# Patient Record
Sex: FEMALE | Race: WHITE | Employment: OTHER | URBAN - METROPOLITAN AREA
[De-identification: names, ages, dates, MRNs, and addresses within clinical notes are randomized per-mention and may not be internally consistent; named-entity substitution may affect disease eponyms.]

---

## 2017-02-16 LAB — MAMMOGRAPHY, EXTERNAL: NORMAL

## 2017-05-02 RX ORDER — LEVOTHYROXINE SODIUM 50 UG/1
TABLET ORAL
Qty: 100 TAB | Refills: 0 | Status: SHIPPED | OUTPATIENT
Start: 2017-05-02 | End: 2017-07-31 | Stop reason: DRUGHIGH

## 2017-05-02 NOTE — TELEPHONE ENCOUNTER
She is due in for full annual labs by the end of June-her thyroid is due now but she can do everything in June so that she doesn't have to be stuck twice. She is due for her [address] and this can be done with Osvaldo Seen in June along with a lab appt that morning. She doesn't have to see Dr Marilia Melvin yet-he did a pre op for her in November.   Lab only appt and appt with Pooja on the same day--thnx

## 2017-05-08 ENCOUNTER — OFFICE VISIT (OUTPATIENT)
Dept: FAMILY MEDICINE CLINIC | Age: 67
End: 2017-05-08

## 2017-05-08 VITALS
DIASTOLIC BLOOD PRESSURE: 82 MMHG | SYSTOLIC BLOOD PRESSURE: 163 MMHG | WEIGHT: 185 LBS | HEART RATE: 76 BPM | BODY MASS INDEX: 30.56 KG/M2

## 2017-05-08 DIAGNOSIS — Z13.31 SCREENING FOR DEPRESSION: ICD-10-CM

## 2017-05-08 DIAGNOSIS — Z00.00 ROUTINE GENERAL MEDICAL EXAMINATION AT A HEALTH CARE FACILITY: ICD-10-CM

## 2017-05-08 DIAGNOSIS — Z13.39 SCREENING FOR ALCOHOLISM: ICD-10-CM

## 2017-05-08 DIAGNOSIS — Z23 ENCOUNTER FOR IMMUNIZATION: ICD-10-CM
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
## 2017-05-08 NOTE — PATIENT INSTRUCTIONS
Medicare Part B Preventive Services Limitations Recommendation Scheduled   Bone Mass Measurement  (age 72 & older, biennial) Requires diagnosis related to osteoporosis or estrogen deficiency. Biennial benefit unless patient has history of long-term glucocorticoid tx or baseline is needed because initial test was by other method A DEXA scan is recommended after you turn 72years of age to determine your risk for osteoporosis Completed on 5/12/16. Cardiovascular Screening Blood Tests (every 5 years)  Total cholesterol, HDL, Triglycerides Order as a panel if possible Last: 6/27/16    Every Year Next: 6/2017   Colorectal Cancer Screening  -Fecal occult blood test (annual)  -Flexible sigmoidoscopy (5y)  -Screening colonoscopy (10y)  -Barium Enema  Last: 2/27/15    Recommended 5 year repeat Next: 2/2020   Diabetes Screening Tests (at least every 3 years, Medicare covers annually or at 6-month intervals for prediabetic patients)    Fasting blood sugar (FBS) or glucose tolerance test (GTT) Patient must be diagnosed with one of the following:  -Hypertension, Dyslipidemia, obesity, previous impaired FBS or GTT  Or any two of the following: overweight, FH of diabetes, age ? 72, history of gestational diabetes, birth of baby weighing more than 9 pounds Last: 6/27/16     Next: 6/2017   Glaucoma Screening (no USPSTF recommendation) Diabetes mellitus, family history, , age 48 or over,  American, age 72 or over Last: 6/15/16     Please get an eye exam sometime this year   Seasonal Influenza Vaccination (annually)  Last: 10/6/16    Every Fall Please get one this Fall. Shingles Vaccination  A shingles vaccine is recommended once in a lifetime after age 61 Please get one at your pharmacy. Pneumococcal Vaccination (once after 65)  Last:  Pneumovax: 1/1/01 and 1/1/83    Prevnar-13: 5/4/16 Due for your last pneumovax   Screening Mammography (biennial age 54-69)?  Annually (age 36 or over) Last: 2/16/17    Repeat in 1 year Next: 2/2018     Please get a shingles vaccine at your pharmacy. Since this vaccine is covered under your prescription benefits, ask the pharmacist to tell you what your copay will be. After you get the vaccine have the pharmacist fax in documentation to the office. Monitor your blood pressure at home and let Dr. Jacy Vergara know if it stays above 140/90.

## 2017-05-08 NOTE — MR AVS SNAPSHOT
Visit Information Date & Time Provider Department Dept. Phone Encounter #  
 5/8/2017 10:00 AM Guerline Iniguez PHARMD [facility] [phone] [id number] Upcoming Health Maintenance Date Due ZOSTER VACCINE AGE 60> 3/15/2008 PAP AKA CERVICAL CYTOLOGY 8/13/2015 Pneumococcal 65+ Low/Medium Risk (2 of 2 - PPSV23) 5/4/2017 MEDICARE YEARLY EXAM 5/5/2017 INFLUENZA AGE 9 TO ADULT 8/1/2017 BREAST CANCER SCRN MAMMOGRAM 2/16/2018 GLAUCOMA SCREENING Q2Y 6/15/2018 COLONOSCOPY 2/27/2020 DTaP/Tdap/Td series (3 - Td) 3/13/2020 Allergies as of 5/8/2017  Review Complete On: 5/8/2017 By: Guerline Iniguez PHARMD  
  
 Severity Noted Reaction Type Reactions Augmentin [Amoxicillin-pot Clavulanate]  03/22/2010    Nausea Only Current Immunizations  Reviewed on 5/8/2017 Name Date Influenza High Dose Vaccine PF 10/6/2016 11:20 AM, 11/15/2015 Influenza Vaccine 10/10/2012 Influenza Vaccine Split 9/22/2010 MMR Vaccine 11/13/2007 Pneumococcal Conjugate (PCV-13) 5/4/2016 11:41 AM  
 Pneumococcal Polysaccharide (PPSV-23)  Incomplete Pneumococcal Vaccine (Unspecified Type) 1/1/2001, 1/1/1983 TD Vaccine 3/13/2010 TDAP Vaccine 11/13/2007 Reviewed by Guerline Iniguez PHARMD on 5/8/2017 at 10:57 AM  
You Were Diagnosed With   
  
 Codes Comments Routine general medical examination at a health care facility     ICD-10-CM: Z00.00 ICD-9-CM: V70.0 Screening for alcoholism     ICD-10-CM: Z13.89 ICD-9-CM: V79.1 Encounter for immunization     ICD-10-CM: S34 ICD-9-CM: V03.89 Vitals BP Pulse Weight(growth percentile) BMI OB Status Smoking Status 163/82 76 185 lb (83.9 kg) 30.56 kg/m2 Postmenopausal Current Some Day Smoker Vitals History BMI and BSA Data Body Mass Index Body Surface Area 30.56 kg/m 2 1.97 m 2 Preferred Pharmacy Pharmacy Name Phone [id number] [facility] [address] AT [address] [facility] [phone] Your Updated Medication List  
  
   
This list is accurate as of: 5/8/17 11:31 AM.  Always use your most recent med list.  
  
  
  
  
 CALCIUM 600 PO Take 1 Tab by mouth nightly. cholecalciferol 1,000 unit Cap Commonly known as:  VITAMIN D3 Take 1,000 Units by mouth daily. levothyroxine 50 mcg tablet Commonly known as:  SYNTHROID  
TAKE 1 TABLET BY MOUTH EVERY DAY EXCEPT ON SATURDAY AND SUNDAY TAKE 1 AND 1/2 TABLETS  
  
 multivitamin tablet Commonly known as:  ONE A DAY Take 1 Tab by mouth daily. pravastatin 20 mg tablet Commonly known as:  PRAVACHOL  
TAKE 1 TABLET BY MOUTH NIGHTLY PROBIOTIC (B. COAGULANS) 10 billion cell Cpdr  
Generic drug:  Bacillus coagulans Take 1 Cap by mouth every other day. We Performed the Following ADMIN PNEUMOCOCCAL VACCINE [ [facility]] PNEUMOCOCCAL POLYSACCHARIDE VACCINE, 23-VALENT, ADULT OR IMMUNOSUPPRESSED PT DOSE, [85788 CPT(R)] Patient Instructions Medicare Part B Preventive Services Limitations Recommendation Scheduled Bone Mass Measurement 
(age 72 & older, biennial) Requires diagnosis related to osteoporosis or estrogen deficiency. Biennial benefit unless patient has history of long-term glucocorticoid tx or baseline is needed because initial test was by other method A DEXA scan is recommended after you turn 72years of age to determine your risk for osteoporosis Completed on 5/12/16. Cardiovascular Screening Blood Tests (every 5 years) Total cholesterol, HDL, Triglycerides Order as a panel if possible Last: 6/27/16 Every Year Next: 6/2017 Colorectal Cancer Screening 
-Fecal occult blood test (annual) -Flexible sigmoidoscopy (5y) 
-Screening colonoscopy (10y) -Barium Enema  Last: 2/27/15 Recommended 5 year repeat Next: 2/2020 Diabetes Screening Tests (at least every 3 years, Medicare covers annually or at 6-month intervals for prediabetic patients) Fasting blood sugar (FBS) or glucose tolerance test (GTT) Patient must be diagnosed with one of the following: 
-Hypertension, Dyslipidemia, obesity, previous impaired FBS or GTT 
Or any two of the following: overweight, FH of diabetes, age ? 72, history of gestational diabetes, birth of baby weighing more than 9 pounds Last: 6/27/16 Next: 6/2017 Glaucoma Screening (no USPSTF recommendation) Diabetes mellitus, family history, , age 48 or over,  American, age 72 or over Last: 6/15/16 Please get an eye exam sometime this year Seasonal Influenza Vaccination (annually)  Last: 10/6/16 Every Fall Please get one this Fall. Shingles Vaccination  A shingles vaccine is recommended once in a lifetime after age 61 Please get one at your pharmacy. Pneumococcal Vaccination (once after 65)  Last: 
Pneumovax: 1/1/01 and 1/1/83 Prevnar-13: 5/4/16 Due for your last pneumovax Screening Mammography (biennial age 54-69)? Annually (age 36 or over) Last: 2/16/17 Repeat in 1 year Next: 2/2018 Please get a shingles vaccine at your pharmacy. Since this vaccine is covered under your prescription benefits, ask the pharmacist to tell you what your copay will be. After you get the vaccine have the pharmacist fax in documentation to the office. Monitor your blood pressure at home and let Dr. Austin Hills know if it stays above 140/90. Introducing [facility]! Hillary Mccormick introduces Kedu[employer] patient portal. Now you can access parts of your medical record, email your doctor's office, and request medication refills online. 1. In your internet browser, go to [email or web address] 2. Click on the First Time User? Click Here link in the Sign In box. You will see the New Member Sign Up page. 3. Enter your WhoKnowst Access Code exactly as it appears below. You will not need to use this code after youve completed the sign-up process. If you do not sign up before the expiration date, you must request a new code. · GlobeIn Access Code: [facility] Expires: 8/6/2017 11:30 AM 
 
4. Enter the last four digits of your Social Security Number (xxxx) and Date of Birth (mm/dd/yyyy) as indicated and click Submit. You will be taken to the next sign-up page. 5. Create a WhoKnowst ID. This will be your GlobeIn login ID and cannot be changed, so think of one that is secure and easy to remember. 6. Create a GlobeIn password. You can change your password at any time. 7. Enter your Password Reset Question and Answer. This can be used at a later time if you forget your password. 8. Enter your e-mail address. You will receive e-mail notification when new information is available in [facility] [address]. 9. Click Sign Up. You can now view and download portions of your medical record. 10. Click the Download Summary menu link to download a portable copy of your medical information. If you have questions, please visit the Frequently Asked Questions section of the GlobeIn website. Remember, GlobeIn is NOT to be used for urgent needs. For medical emergencies, dial 911. Now available from your iPhone and Android! Please provide this summary of care documentation to your next provider. Your primary care clinician is listed as [id number] If you have any questions after today's visit, please call [phone].

## 2017-06-06 PROBLEM — R73.03 PREDIABETES: Status: ACTIVE | Noted: 2017-06-06

## 2017-06-12 ENCOUNTER — LAB ONLY (OUTPATIENT)
Dept: FAMILY MEDICINE CLINIC | Age: 67
End: 2017-06-12

## 2017-06-12 DIAGNOSIS — N39.3 URINARY, INCONTINENCE, STRESS FEMALE: ICD-10-CM

## 2017-06-12 DIAGNOSIS — R73.03 PREDIABETES: Primary | ICD-10-CM

## 2017-06-12 DIAGNOSIS — R73.09 INCREASED GLUCOSE LEVEL: ICD-10-CM

## 2017-06-12 DIAGNOSIS — E03.9 HYPOTHYROIDISM, UNSPECIFIED TYPE: ICD-10-CM

## 2017-06-12 DIAGNOSIS — E55.9 VITAMIN D DEFICIENCY: ICD-10-CM

## 2017-06-12 DIAGNOSIS — E78.00 PURE HYPERCHOLESTEROLEMIA: ICD-10-CM

## 2017-06-12 DIAGNOSIS — M85.80 OSTEOPENIA, UNSPECIFIED LOCATION: ICD-10-CM

## 2017-06-12 NOTE — LETTER
6/30/2017 10:23 AM 
 
Ms. Amanda Ernst [address] [facility] [address] [phone] [id number] Dear Amanda Ernst: Please find your most recent results below. Resulted Orders VITAMIN D, 25 HYDROXY Result Value Ref Range VITAMIN D, 25-HYDROXY 33.0 30.0 - 100.0 ng/mL Comment:  
   Vitamin D deficiency has been defined by the [address] [place] [address] practice guideline as a 
level of serum 25-OH vitamin D less than 20 ng/mL (1,2). The Endocrine Society went on to further define vitamin D 
insufficiency as a level between 21 and 29 ng/mL (2). 1. IOM ([place] of Medicine). 2010. Dietary reference 
   intakes for calcium and D. [address] [facility]: [facility] 
   [employer] 2. Jermaine MF, Haven JON, Savanna CANTU, et al. 
   Evaluation, treatment, and prevention of vitamin D 
   deficiency: an Endocrine Society clinical practice 
   guideline. JCEM. 2011 Jul; 96(7):1911-30. Narrative Performed at:  [address]  [id number] : Rehana Preston MD, Phone:  [phone] TSH 3RD GENERATION Result Value Ref Range TSH 5.600 (H) 0.450 - 4.500 uIU/mL Narrative Performed at:  [address]  [id number] : Rehana Preston MD, Phone:  [phone] T4, FREE Result Value Ref Range T4, Free 1.38 0.82 - 1.77 ng/dL Narrative Performed at:  [address]  [id number] : Rehana Prestno MD, Phone:  [phone] METABOLIC PANEL, COMPREHENSIVE Result Value Ref Range Glucose 111 (H) 65 - 99 mg/dL BUN 15 8 - 27 mg/dL Creatinine 0.79 0.57 - 1.00 mg/dL GFR est non-AA 77 >59 mL/min/1.73 GFR est AA 88 >59 mL/min/1.73  
 BUN/Creatinine ratio 19 12 - 28 Sodium 142 134 - 144 mmol/L Potassium 4.4 3.5 - 5.2 mmol/L  Chloride 103 96 - 106 mmol/L  
 CO2 22 18 - 29 mmol/L  
 Calcium 9.4 8.7 - 10.3 mg/dL Protein, total 6.7 6.0 - 8.5 g/dL Albumin 4.2 3.6 - 4.8 g/dL GLOBULIN, TOTAL 2.5 1.5 - 4.5 g/dL A-G Ratio 1.7 1.2 - 2.2 Bilirubin, total 0.9 0.0 - 1.2 mg/dL Alk. phosphatase 100 39 - 117 IU/L  
 AST (SGOT) 26 0 - 40 IU/L  
 ALT (SGPT) 20 0 - 32 IU/L Narrative Performed at:  [address]  [id number] : Carlos Chavez MD, Phone:  [phone] LIPID PANEL Result Value Ref Range Cholesterol, total 247 (H) 100 - 199 mg/dL Triglyceride 98 0 - 149 mg/dL HDL Cholesterol 94 >39 mg/dL VLDL, calculated 20 5 - 40 mg/dL LDL, calculated 133 (H) 0 - 99 mg/dL Narrative Performed at:  [address]  [id number] : Carlos Chavez MD, Phone:  [phone] UA/M W/RFLX CULTURE, ROUTINE Result Value Ref Range Specific Gravity 1.022 1.005 - 1.030  
 pH (UA) 6.0 5.0 - 7.5 Color Yellow Yellow Appearance Cloudy (A) Clear Leukocyte Esterase 2+ (A) Negative Protein Negative Negative/Trace Glucose Negative Negative Ketone Negative Negative Blood Negative Negative Bilirubin Negative Negative Urobilinogen 0.2 0.2 - 1.0 mg/dL Nitrites Negative Negative Microscopic Examination See additional order Comment:  
   Microscopic was indicated and was performed. URINALYSIS REFLEX Comment Comment: This specimen has reflexed to a Urine Culture. Narrative Performed at:  [address]  [id number] : Carlos Chavez MD, Phone:  [phone] HEMOGLOBIN A1C WITH EAG Result Value Ref Range Hemoglobin A1c 5.2 4.8 - 5.6 % Comment:  
            Pre-diabetes: 5.7 - 6.4 Diabetes: >6.4 Glycemic control for adults with diabetes: <7.0 Estimated average glucose 103 mg/dL Narrative Performed at:  [place] [address] [address]  [id number] : Rodríguez Garcia MD, Phone:  [phone] MICROSCOPIC EXAMINATION Result Value Ref Range WBC 11-30 (A) 0 - 5 /hpf  
 RBC 0-2 0 - 2 /hpf Epithelial cells 0-10 0 - 10 /hpf Casts None seen None seen /lpf Mucus Present Not Estab. Bacteria None seen None seen/Few Narrative Performed at:  [address]  [id number] : Rodríguez Garcia MD, Phone:  [phone] URINE CULTURE, ROUTINE Result Value Ref Range Urine Culture, Routine (A) Pseudomonas aeruginosa Greater than 100,000 colony forming units per mL Urine Culture, Routine Mixed urogenital oliverio 10,000-25,000 colony forming units per mL Narrative Performed at:  [address]  [id number] : Rodríguez Garcia MD, Phone:  [phone] CVD REPORT Result Value Ref Range INTERPRETATION Note Comment:  
   Supplement report is available. Narrative Performed at:  [address] [place] [address] A [address] [place] [address]  [id number] : Jono Black PhD, Phone:  [phone] RECOMMENDATIONS: 
 
 
Sincerely, Lab Brfp

## 2017-06-12 NOTE — LETTER
6/28/2017 3:07 PM 
 
Ms. Abdifatah Adkins [address] [facility]. Unit [phone] [phone] Dear Abdifatah Adkins: Please find your most recent results below. Resulted Orders VITAMIN D, 25 HYDROXY Result Value Ref Range VITAMIN D, 25-HYDROXY 33.0 30.0 - 100.0 ng/mL Comment:  
   Vitamin D deficiency has been defined by the [address] [place] [address] practice guideline as a 
level of serum 25-OH vitamin D less than 20 ng/mL (1,2). The Endocrine Society went on to further define vitamin D 
insufficiency as a level between 21 and 29 ng/mL (2). 1. IOM ([place] of Medicine). 2010. Dietary reference 
   intakes for calcium and D. [facility]: The 
   [employer]. 2. Jermaine MF, Haven JON, Savanna CANTU, et al. 
   Evaluation, treatment, and prevention of vitamin D 
   deficiency: an Endocrine Society clinical practice 
   guideline. JCEM. 2011 Jul; 96(7):1911-30. Narrative Performed at:  [address]  [id number] : Lety Arcos MD, Phone:  [phone] TSH 3RD GENERATION Result Value Ref Range TSH 5.600 (H) 0.450 - 4.500 uIU/mL Narrative Performed at:  [address]  [id number] : Lety Arcos MD, Phone:  [phone] T4, FREE Result Value Ref Range T4, Free 1.38 0.82 - 1.77 ng/dL Narrative Performed at:  [address]  [id number] : Lety Arcos MD, Phone:  [phone] METABOLIC PANEL, COMPREHENSIVE Result Value Ref Range Glucose 111 (H) 65 - 99 mg/dL BUN 15 8 - 27 mg/dL Creatinine 0.79 0.57 - 1.00 mg/dL GFR est non-AA 77 >59 mL/min/1.73 GFR est AA 88 >59 mL/min/1.73  
 BUN/Creatinine ratio 19 12 - 28 Sodium 142 134 - 144 mmol/L Potassium 4.4 3.5 - 5.2 mmol/L  Chloride 103 96 - 106 mmol/L  
 CO2 22 18 - 29 mmol/L  
 Calcium 9.4 8.7 - 10.3 mg/dL Protein, total 6.7 6.0 - 8.5 g/dL Albumin 4.2 3.6 - 4.8 g/dL GLOBULIN, TOTAL 2.5 1.5 - 4.5 g/dL A-G Ratio 1.7 1.2 - 2.2 Bilirubin, total 0.9 0.0 - 1.2 mg/dL Alk. phosphatase 100 39 - 117 IU/L  
 AST (SGOT) 26 0 - 40 IU/L  
 ALT (SGPT) 20 0 - 32 IU/L Narrative Performed at:  [address]  [id number] : Pedro Patton MD, Phone:  [phone] LIPID PANEL Result Value Ref Range Cholesterol, total 247 (H) 100 - 199 mg/dL Triglyceride 98 0 - 149 mg/dL HDL Cholesterol 94 >39 mg/dL VLDL, calculated 20 5 - 40 mg/dL LDL, calculated 133 (H) 0 - 99 mg/dL Narrative Performed at:  [address]  [id number] : Pedro Patton MD, Phone:  [phone] UA/M W/RFLX CULTURE, ROUTINE Result Value Ref Range Specific Gravity 1.022 1.005 - 1.030  
 pH (UA) 6.0 5.0 - 7.5 Color Yellow Yellow Appearance Cloudy (A) Clear Leukocyte Esterase 2+ (A) Negative Protein Negative Negative/Trace Glucose Negative Negative Ketone Negative Negative Blood Negative Negative Bilirubin Negative Negative Urobilinogen 0.2 0.2 - 1.0 mg/dL Nitrites Negative Negative Microscopic Examination See additional order Comment:  
   Microscopic was indicated and was performed. URINALYSIS REFLEX Comment Comment: This specimen has reflexed to a Urine Culture. Narrative Performed at:  [address]  [id number] : Pedro Patton MD, Phone:  [phone] HEMOGLOBIN A1C WITH EAG Result Value Ref Range Hemoglobin A1c 5.2 4.8 - 5.6 % Comment:  
            Pre-diabetes: 5.7 - 6.4 Diabetes: >6.4 Glycemic control for adults with diabetes: <7.0 Estimated average glucose 103 mg/dL Narrative Performed at:  [place] [address] [address]  [id number] : Sloane Guerrero MD, Phone:  [phone] MICROSCOPIC EXAMINATION Result Value Ref Range WBC 11-30 (A) 0 - 5 /hpf  
 RBC 0-2 0 - 2 /hpf Epithelial cells 0-10 0 - 10 /hpf Casts None seen None seen /lpf Mucus Present Not Estab. Bacteria None seen None seen/Few Narrative Performed at:  [address]  [id number] : Sloane Guerrero MD, Phone:  [phone] URINE CULTURE, ROUTINE Result Value Ref Range Urine Culture, Routine (A) Pseudomonas aeruginosa Greater than 100,000 colony forming units per mL Urine Culture, Routine Mixed urogenital oliverio 10,000-25,000 colony forming units per mL Narrative Performed at:  [address]  [id number] : Sloane Guerrero MD, Phone:  [phone] CVD REPORT Result Value Ref Range INTERPRETATION Note Comment:  
   Supplement report is available. Narrative Performed at:  [address] [place] [address] [address] [place] [address]  [id number] : Faye Bishop PhD, Phone:  [phone] RECOMMENDATIONS: 
 
Sincerely, [facility]

## 2017-06-28 LAB
25(OH)D3+25(OH)D2 SERPL-MCNC: 33 NG/ML (ref 30–100)
ALBUMIN SERPL-MCNC: 4.2 G/DL (ref 3.6–4.8)
ALBUMIN/GLOB SERPL: 1.7 {RATIO} (ref 1.2–2.2)
ALP SERPL-CCNC: 100 IU/L (ref 39–117)
ALT SERPL-CCNC: 20 IU/L (ref 0–32)
APPEARANCE UR: ABNORMAL
AST SERPL-CCNC: 26 IU/L (ref 0–40)
BACTERIA #/AREA URNS HPF: ABNORMAL /[HPF]
BACTERIA UR CULT: ABNORMAL
BACTERIA UR CULT: ABNORMAL
BILIRUB SERPL-MCNC: 0.9 MG/DL (ref 0–1.2)
BILIRUB UR QL STRIP: NEGATIVE
BUN SERPL-MCNC: 15 MG/DL (ref 8–27)
BUN/CREAT SERPL: 19 (ref 12–28)
CALCIUM SERPL-MCNC: 9.4 MG/DL (ref 8.7–10.3)
CASTS URNS QL MICRO: ABNORMAL /LPF
CHLORIDE SERPL-SCNC: 103 MMOL/L (ref 96–106)
CHOLEST SERPL-MCNC: 247 MG/DL (ref 100–199)
CO2 SERPL-SCNC: 22 MMOL/L (ref 18–29)
COLOR UR: YELLOW
CREAT SERPL-MCNC: 0.79 MG/DL (ref 0.57–1)
EPI CELLS #/AREA URNS HPF: ABNORMAL /HPF
EST. AVERAGE GLUCOSE BLD GHB EST-MCNC: 103 MG/DL
GLOBULIN SER CALC-MCNC: 2.5 G/DL (ref 1.5–4.5)
GLUCOSE SERPL-MCNC: 111 MG/DL (ref 65–99)
GLUCOSE UR QL: NEGATIVE
HBA1C MFR BLD: 5.2 % (ref 4.8–5.6)
HDLC SERPL-MCNC: 94 MG/DL
HGB UR QL STRIP: NEGATIVE
INTERPRETATION, 910389: NORMAL
KETONES UR QL STRIP: NEGATIVE
LDLC SERPL CALC-MCNC: 133 MG/DL (ref 0–99)
LEUKOCYTE ESTERASE UR QL STRIP: ABNORMAL
MICRO URNS: ABNORMAL
MUCOUS THREADS URNS QL MICRO: PRESENT
NITRITE UR QL STRIP: NEGATIVE
PH UR STRIP: 6 [PH] (ref 5–7.5)
POTASSIUM SERPL-SCNC: 4.4 MMOL/L (ref 3.5–5.2)
PROT SERPL-MCNC: 6.7 G/DL (ref 6–8.5)
PROT UR QL STRIP: NEGATIVE
RBC #/AREA URNS HPF: ABNORMAL /HPF
SODIUM SERPL-SCNC: 142 MMOL/L (ref 134–144)
SP GR UR: 1.02 (ref 1–1.03)
T4 FREE SERPL-MCNC: 1.38 NG/DL (ref 0.82–1.77)
TRIGL SERPL-MCNC: 98 MG/DL (ref 0–149)
TSH SERPL DL<=0.005 MIU/L-ACNC: 5.6 UIU/ML (ref 0.45–4.5)
URINALYSIS REFLEX, 377202: ABNORMAL
UROBILINOGEN UR STRIP-MCNC: 0.2 MG/DL (ref 0.2–1)
VLDLC SERPL CALC-MCNC: 20 MG/DL (ref 5–40)
WBC #/AREA URNS HPF: ABNORMAL /HPF

## 2017-06-28 NOTE — PROGRESS NOTES
Thyroid low. If no missed doses increase to next increment and recheck 4-6 weeks. Lipids slt improved. Nml average BS. Pyuria. Pos UC. Begin Cipro 250 mg BID for 3 days and recheck post tx.

## 2017-06-30 DIAGNOSIS — R82.81 PYURIA: Primary | ICD-10-CM

## 2017-06-30 DIAGNOSIS — E03.9 HYPOTHYROIDISM, UNSPECIFIED TYPE: ICD-10-CM

## 2017-06-30 RX ORDER — LEVOTHYROXINE SODIUM 75 UG/1
75 TABLET ORAL
Qty: 30 TAB | Refills: 0 | Status: SHIPPED | OUTPATIENT
Start: 2017-06-30 | End: 2017-07-30 | Stop reason: SDUPTHER

## 2017-06-30 RX ORDER — CIPROFLOXACIN 250 MG/1
250 TABLET, FILM COATED ORAL EVERY 12 HOURS
Qty: 6 TAB | Refills: 0 | Status: SHIPPED | OUTPATIENT
Start: 2017-06-30 | End: 2017-07-03

## 2017-06-30 NOTE — PROGRESS NOTES
Discussed and no missed doses-rx done -order slip to patient to repeat UC once done with Rx and in 6 weeks for dose increase on the thyroid.  Prefers to go to [place] since it is so much closer to her job

## 2017-07-04 LAB — BACTERIA UR CULT: NORMAL

## 2017-07-24 RX ORDER — PRAVASTATIN SODIUM 20 MG/1
TABLET ORAL
Qty: 90 TAB | Refills: 3 | Status: SHIPPED | OUTPATIENT
Start: 2017-07-24 | End: 2018-07-19 | Stop reason: SDUPTHER

## 2017-07-31 RX ORDER — LEVOTHYROXINE SODIUM 75 UG/1
TABLET ORAL
Qty: 30 TAB | Refills: 0 | Status: SHIPPED | OUTPATIENT
Start: 2017-07-31 | End: 2017-08-30 | Stop reason: SDUPTHER

## 2017-08-10 NOTE — PROGRESS NOTES
Patient told negative UC-will follow up if MD reviews and feels differently. Results provided by phone. Please see addendum note on 8/9/17 by me.    Austin Garsia MD, MS    Department of Dermatology  [facility]: Phone: [phone], Fax:[phone]  [facility]: Phone: [phone], Fax: [phone]

## 2017-08-29 LAB
T4 FREE SERPL-MCNC: 1.38 NG/DL (ref 0.82–1.77)
TSH SERPL DL<=0.005 MIU/L-ACNC: 2.31 UIU/ML (ref 0.45–4.5)

## 2017-09-05 ENCOUNTER — TELEPHONE (OUTPATIENT)
Dept: FAMILY MEDICINE CLINIC | Age: 67
End: 2017-09-05

## 2017-09-05 RX ORDER — LEVOTHYROXINE SODIUM 75 UG/1
TABLET ORAL
Qty: 90 TAB | Refills: 3 | Status: SHIPPED | OUTPATIENT
Start: 2017-09-05 | End: 2018-08-12 | Stop reason: SDUPTHER

## 2017-09-05 NOTE — TELEPHONE ENCOUNTER
----- Message from Ca Evans sent at 9/5/2017 10:46 AM EDT -----  Regarding: Dr. Shashank Hernandes   Pt is f/up on her request for refill on levothyroxine. Best contact is [phone]. Pt use [facility] on file. [phone].

## 2017-10-09 DIAGNOSIS — M23.91 ACUTE INTERNAL DERANGEMENT OF KNEE, RIGHT: Primary | ICD-10-CM

## 2018-07-19 RX ORDER — PRAVASTATIN SODIUM 20 MG/1
TABLET ORAL
Qty: 90 TAB | Refills: 0 | Status: SHIPPED | OUTPATIENT
Start: 2018-07-19 | End: 2018-10-14 | Stop reason: SDUPTHER

## 2018-07-19 NOTE — TELEPHONE ENCOUNTER
PCP: Elda Jenkins MD    Last appt: 6/26/2017  No future appointments.     Requested Prescriptions     Pending Prescriptions Disp Refills    pravastatin (PRAVACHOL) 20 mg tablet [Pharmacy Med Name: PRAVASTATIN SODIUM 20 MG TAB] 90 Tab 3     Sig: TAKE 1 TABLET BY MOUTH NIGHTLY       Prior labs and Blood pressures:  BP Readings from Last 3 Encounters:   05/08/17 163/82   10/06/16 131/79   05/04/16 132/73     Lab Results   Component Value Date/Time    Sodium 142 06/26/2017 09:05 AM    Potassium 4.4 06/26/2017 09:05 AM    Chloride 103 06/26/2017 09:05 AM    CO2 22 06/26/2017 09:05 AM    Anion gap 12 09/22/2010 09:41 AM    Glucose 111 (H) 06/26/2017 09:05 AM    BUN 15 06/26/2017 09:05 AM    Creatinine 0.79 06/26/2017 09:05 AM    BUN/Creatinine ratio 19 06/26/2017 09:05 AM    GFR est AA 88 06/26/2017 09:05 AM    GFR est non-AA 77 06/26/2017 09:05 AM    Calcium 9.4 06/26/2017 09:05 AM     Lab Results   Component Value Date/Time    Hemoglobin A1c 5.2 06/26/2017 09:05 AM     Lab Results   Component Value Date/Time    Cholesterol, total 247 (H) 06/26/2017 09:05 AM    HDL Cholesterol 94 06/26/2017 09:05 AM    LDL, calculated 133 (H) 06/26/2017 09:05 AM    VLDL, calculated 20 06/26/2017 09:05 AM    Triglyceride 98 06/26/2017 09:05 AM    CHOL/HDL Ratio 2.7 09/22/2010 09:41 AM     Lab Results   Component Value Date/Time    Vitamin D 25-Hydroxy 33.6 12/28/2010 08:15 AM    VITAMIN D, 25-HYDROXY 33.0 06/26/2017 09:05 AM       Lab Results   Component Value Date/Time    TSH 2.310 08/28/2017 07:56 AM

## 2018-08-09 ENCOUNTER — TELEPHONE (OUTPATIENT)
Dept: FAMILY MEDICINE CLINIC | Age: 68
End: 2018-08-09

## 2018-08-09 NOTE — TELEPHONE ENCOUNTER
Patient called the office complaining about her visit being cancelled yesterday on 8/8/18 and she didn't know why (phytel cancellation), offered patient same day appointment for different times, patient declined. Patient called in the office this morning and was offered a same day appointment for today and declined, patient is requesting medication to be sent in to calm her nerves on the plane. Patient stated Rebel Carrillo has prescribed it before.  Also informed patient that she might need an ov.   P: [phone]

## 2018-10-01 ENCOUNTER — OFFICE VISIT (OUTPATIENT)
Dept: FAMILY MEDICINE CLINIC | Age: 68
End: 2018-10-01

## 2018-10-01 VITALS
TEMPERATURE: 96.3 F | DIASTOLIC BLOOD PRESSURE: 62 MMHG | OXYGEN SATURATION: 98 % | HEIGHT: 65 IN | RESPIRATION RATE: 18 BRPM | HEART RATE: 83 BPM | SYSTOLIC BLOOD PRESSURE: 128 MMHG | WEIGHT: 181.7 LBS | BODY MASS INDEX: 30.27 KG/M2

## 2018-10-01 DIAGNOSIS — N39.3 URINARY, INCONTINENCE, STRESS FEMALE: ICD-10-CM

## 2018-10-01 DIAGNOSIS — R73.03 PREDIABETES: ICD-10-CM

## 2018-10-01 DIAGNOSIS — E03.9 HYPOTHYROIDISM, UNSPECIFIED TYPE: Primary | ICD-10-CM

## 2018-10-01 DIAGNOSIS — Z23 ENCOUNTER FOR IMMUNIZATION: ICD-10-CM

## 2018-10-01 DIAGNOSIS — E55.9 VITAMIN D DEFICIENCY: ICD-10-CM

## 2018-10-01 DIAGNOSIS — E78.00 PURE HYPERCHOLESTEROLEMIA: ICD-10-CM

## 2018-10-01 DIAGNOSIS — M85.80 OSTEOPENIA, UNSPECIFIED LOCATION: ICD-10-CM

## 2018-10-01 RX ORDER — LEVOTHYROXINE SODIUM 75 UG/1
TABLET ORAL
Qty: 90 TAB | Refills: 3 | Status: SHIPPED | OUTPATIENT
Start: 2018-10-01 | End: 2019-09-21 | Stop reason: SDUPTHER

## 2018-10-01 NOTE — PROGRESS NOTES
Hernesto Jackson  Identified pt with two pt identifiers(name and ). No chief complaint on file. 1. Have you been to the ER, urgent care clinic since your last visit? Hospitalized since your last visit? no 
 
2. Have you seen or consulted any other health care providers outside of the [address] [place] [address] since your last visit? Include any pap smears or colon screening. no Advance Care Planning In the event something were to happen to you and you were unable to speak on your behalf, do you have an Advance Directive/ Living Will in place stating your wishes? yes If yes, do we have a copy on file no Medication reconciliation up to date and corrected with patient at this time. Today's provider has been notified of reason for visit, vitals and flowsheets obtained on patients. Reviewed record in preparation for visit, huddled with provider and have obtained necessary documentation. Health Maintenance Due Topic  Shingrix Vaccine Age 50> (1 of 2)  PAP AKA CERVICAL CYTOLOGY  MEDICARE YEARLY EXAM   
 GLAUCOMA SCREENING Q2Y  Influenza Age 5 to Adult Wt Readings from Last 3 Encounters:  
10/01/18 181 lb 11.2 oz (82.4 kg) 17 185 lb (83.9 kg) 10/06/16 190 lb 0.6 oz (86.2 kg) Temp Readings from Last 3 Encounters:  
10/01/18 96.3 °F (35.7 °C) (Oral) 10/06/16 97.3 °F (36.3 °C) (Oral) 16 97 °F (36.1 °C) (Oral) BP Readings from Last 3 Encounters:  
10/01/18 128/62  
17 163/82  
10/06/16 131/79 Pulse Readings from Last 3 Encounters:  
10/01/18 83  
17 76  
10/06/16 79 Vitals:  
 10/01/18 9719 BP: 128/62 Pulse: 83 Resp: 18 Temp: 96.3 °F (35.7 °C) TempSrc: Oral  
SpO2: 98% Weight: 181 lb 11.2 oz (82.4 kg) Height: 5' 5\" (1.651 m) PainSc:   0 - No pain Learning Assessment: 
:  
 
Learning Assessment 10/1/2018 2014 PRIMARY LEARNER Patient Patient HIGHEST LEVEL OF EDUCATION - PRIMARY LEARNER  2 YEARS OF COLLEGE 2 YEARS OF COLLEGE  
BARRIERS PRIMARY LEARNER - VISUAL  
CO-LEARNER CAREGIVER - No  
PRIMARY LANGUAGE ENGLISH ENGLISH  
LEARNER PREFERENCE PRIMARY DEMONSTRATION DEMONSTRATION  
ANSWERED BY Patient patient RELATIONSHIP SELF SELF Depression Screening: 
:  
 
PHQ over the last two weeks 10/1/2018 Little interest or pleasure in doing things Not at all Feeling down, depressed, irritable, or hopeless Not at all Total Score PHQ 2 0 Fall Risk Assessment: 
:  
 
Fall Risk Assessment, last 12 mths 5/8/2017 Able to walk? Yes Fall in past 12 months? Yes Fall with injury? No  
Number of falls in past 12 months 1 Fall Risk Score 1 Abuse Screening: 
:  
 
Abuse Screening Questionnaire 10/1/2018 Do you ever feel afraid of your partner? Charles Miranda Are you in a relationship with someone who physically or mentally threatens you? Charlesadriano Miranda Is it safe for you to go home? Y  
 
 
ADL Screening: 
:  
 
ADL Assessment 10/1/2018 Feeding yourself No Help Needed Getting from bed to chair No Help Needed Getting dressed No Help Needed Bathing or showering No Help Needed Walk across the room (includes cane/walker) No Help Needed Using the telphone No Help Needed Taking your medications No Help Needed Preparing meals No Help Needed Managing money (expenses/bills) No Help Needed Moderately strenuous housework (laundry) No Help Needed Shopping for personal items (toiletries/medicines) No Help Needed Shopping for groceries No Help Needed Driving No Help Needed Climbing a flight of stairs No Help Needed Getting to places beyond walking distances No Help Needed Medication reconciliation up to date and corrected with patient at this time.

## 2018-10-01 NOTE — PROGRESS NOTES
Coughing past week. Began with ST. Coughing up cloudy mucus. Hoarse off and on for a week. Taking Mucinex. No fever. Redeveloped fear of flying. Previously took Valium. Discussed new Shingles vaccine. Here for thyroid med refill. Annual labs and [address] overdue. Needs Td. Visit Vitals  /62 (BP 1 Location: Right arm, BP Patient Position: Sitting)  Pulse 83  Temp 96.3 °F (35.7 °C) (Oral)  Resp 18  Ht 5' 5\" (1.651 m)  Wt 181 lb 11.2 oz (82.4 kg)  SpO2 98%  BMI 30.24 kg/m2 Patient alert and cooperative. Lungs clear all fields. Coarse sounding cough. Assessment: 
1. URI. Plan: 1. Call if purulence, fever, illness lasting over 10 days. 2. Annual labs ordered. 3. Return for wellness visit, controlled med refill for flying. 4. Follow otherwise here prn.

## 2018-10-01 NOTE — MR AVS SNAPSHOT
[address] [place] [address] 
[address] [place] [address] 
[phone] Patient: Wes De Jesus MRN:  [id number] Visit Information Date & Time Provider Department Dept. Phone Encounter #  
 10/1/2018  9:40 AM Stefani Hodgkins, MD [facility] [phone] [id number] Follow-up Instructions Return in about 4 weeks (around 10/29/2018) for [address], Controlled med refill, 40 min. Upcoming Health Maintenance Date Due Shingrix Vaccine Age 50> (1 of 2) 3/15/1998 MEDICARE YEARLY EXAM 5/9/2018 GLAUCOMA SCREENING Q2Y 6/15/2018 BREAST CANCER SCRN MAMMOGRAM 2/19/2019 COLONOSCOPY 2/27/2020 DTaP/Tdap/Td series (3 - Td) 3/13/2020 Allergies as of 10/1/2018  Review Complete On: 10/1/2018 By: Stefani Hodgkins, MD  
  
 Severity Noted Reaction Type Reactions Augmentin [Amoxicillin-pot Clavulanate]  03/22/2010    Nausea Only Current Immunizations  Reviewed on 2/12/2018 Name Date Influenza High Dose Vaccine PF 9/10/2018, 10/6/2016 11:20 AM, 11/15/2015 Influenza Vaccine 10/10/2012 Influenza Vaccine Split 9/22/2010 MMR Vaccine 11/13/2007 Pneumococcal Conjugate (PCV-13) 2/2/2018, 5/4/2016 11:41 AM  
 Pneumococcal Polysaccharide (PPSV-23) 5/8/2017 TD Vaccine 3/13/2010 TDAP Vaccine 11/13/2007 Td, Adsorbed  Incomplete ZZZ-RETIRED (DO NOT USE) Pneumococcal Vaccine (Unspecified Type) 1/1/2001, 1/1/1983 Not reviewed this visit You Were Diagnosed With   
  
 Codes Comments Hypothyroidism, unspecified type    -  Primary ICD-10-CM: E03.9 ICD-9-CM: 919. 9 Vitamin D deficiency     ICD-10-CM: E55.9 ICD-9-CM: 268.9 Pure hypercholesterolemia     ICD-10-CM: E78.00 ICD-9-CM: 272.0 Osteopenia, unspecified location     ICD-10-CM: M85.80 ICD-9-CM: 733.90 Prediabetes     ICD-10-CM: R73.03 
ICD-9-CM: 790.29 Urinary, incontinence, stress female     ICD-10-CM: N39.3 ICD-9-CM: 625.6 Encounter for immunization     ICD-10-CM: O15 ICD-9-CM: V03.89 Vitals BP Pulse Temp Resp Height(growth percentile) Weight(growth percentile) 128/62 (BP 1 Location: Right arm, BP Patient Position: Sitting) 83 96.3 °F (35.7 °C) (Oral) 18 5' 5\" (1.651 m) 181 lb 11.2 oz (82.4 kg) SpO2 BMI OB Status Smoking Status 98% 30.24 kg/m2 Postmenopausal Current Some Day Smoker Vitals History BMI and BSA Data Body Mass Index Body Surface Area  
 30.24 kg/m 2 1.94 m 2 Preferred Pharmacy Pharmacy Name Phone [id number] [id number] [facility] AT [address] [place] [address] [phone] Your Updated Medication List  
  
   
This list is accurate as of 10/1/18 10:29 AM.  Always use your most recent med list.  
  
  
  
  
 CALCIUM 600 PO Take 1 Tab by mouth nightly. cholecalciferol 1,000 unit Cap Commonly known as:  VITAMIN D3 Take 1,000 Units by mouth daily. levothyroxine 75 mcg tablet Commonly known as:  SYNTHROID  
TAKE 1 TABLET BY MOUTH EVERY DAY BEFORE BREAKFAST  
  
 multivitamin tablet Commonly known as:  ONE A DAY Take 1 Tab by mouth daily. pravastatin 20 mg tablet Commonly known as:  PRAVACHOL  
TAKE 1 TABLET BY MOUTH NIGHTLY PROBIOTIC (B. COAGULANS) 10 billion cell Cpdr  
Generic drug:  Bacillus coagulans Take 1 Cap by mouth every other day. Prescriptions Sent to Pharmacy Refills  
 levothyroxine (SYNTHROID) 75 mcg tablet 3 Sig: TAKE 1 TABLET BY MOUTH EVERY DAY BEFORE BREAKFAST Class: Normal  
 Pharmacy: [facility]/pharmacy #[id number] [id number] [facility] AT [address] [place] [address] Ph #: [phone] We Performed the Following CBC WITH AUTOMATED DIFF [66105 CPT(R)] HEMOGLOBIN A1C WITH EAG [07471 CPT(R)] LIPID PANEL [86422 CPT(R)] METABOLIC PANEL, COMPREHENSIVE [69664 CPT(R)] CT IMMUNIZ ADMIN,1 SINGLE/COMB VAC/TOXOID [id number] CPT(R)] T4, FREE [id number] CPT(R)] TETANUS AND DIPHTHERIA TOXOIDS (TD) ADSORBED, PRES. FREE, IN INDIVIDS. >=7, IM [id number] CPT(R)] TSH 3RD GENERATION [20358 CPT(R)] URINALYSIS W/ RFLX MICROSCOPIC [43879 CPT(R)] VITAMIN D, 25 HYDROXY [id number] CPT(R)] Follow-up Instructions Return in about 4 weeks (around 10/29/2018) for [address], Controlled med refill, 40 min. Introducing [facility]! [employer] introduces [employer] patient portal. Now you can access parts of your medical record, email your doctor's office, and request medication refills online. 1. In your internet browser, go to [email or web address] 2. Click on the First Time User? Click Here link in the Sign In box. You will see the New Member Sign Up page. 3. Enter your [employer] Access Code exactly as it appears below. You will not need to use this code after youve completed the sign-up process. If you do not sign up before the expiration date, you must request a new code. · [employer] Access Code: [email or web address] Expires: 12/30/2018 10:11 AM 
 
4. Enter the last four digits of your Social Security Number (xxxx) and Date of Birth (mm/dd/yyyy) as indicated and click Submit. You will be taken to the next sign-up page. 5. Create a [employer] ID. This will be your [employer] login ID and cannot be changed, so think of one that is secure and easy to remember. 6. Create a [employer] password. You can change your password at any time. 7. Enter your Password Reset Question and Answer. This can be used at a later time if you forget your password. 8. Enter your e-mail address. You will receive e-mail notification when new information is available in [address] 9. Click Sign Up. You can now view and download portions of your medical record. 10. Click the Download Summary menu link to download a portable copy of your medical information.  
 
If you have questions, please visit the Frequently Asked Questions section of the [employer]. Remember, [email or web address] is NOT to be used for urgent needs. For medical emergencies, dial 911. Now available from your iPhone and Android! Please provide this summary of care documentation to your next provider. Your primary care clinician is listed as [id number] If you have any questions after today's visit, please call [phone].

## 2018-10-02 LAB
25(OH)D3+25(OH)D2 SERPL-MCNC: 47.9 NG/ML (ref 30–100)
ALBUMIN SERPL-MCNC: 4.3 G/DL (ref 3.5–4.8)
ALBUMIN/GLOB SERPL: 1.7 {RATIO} (ref 1.2–2.2)
ALP SERPL-CCNC: 96 IU/L (ref 39–117)
ALT SERPL-CCNC: 14 IU/L (ref 0–32)
APPEARANCE UR: ABNORMAL
AST SERPL-CCNC: 20 IU/L (ref 0–40)
BACTERIA #/AREA URNS HPF: ABNORMAL /[HPF]
BASOPHILS # BLD AUTO: 0.1 X10E3/UL (ref 0–0.2)
BASOPHILS NFR BLD AUTO: 1 %
BILIRUB SERPL-MCNC: 0.7 MG/DL (ref 0–1.2)
BILIRUB UR QL STRIP: NEGATIVE
BUN SERPL-MCNC: 18 MG/DL (ref 8–27)
BUN/CREAT SERPL: 26 (ref 12–28)
CALCIUM SERPL-MCNC: 9.3 MG/DL (ref 8.7–10.3)
CASTS URNS QL MICRO: ABNORMAL /LPF
CHLORIDE SERPL-SCNC: 103 MMOL/L (ref 96–106)
CHOLEST SERPL-MCNC: 235 MG/DL (ref 100–199)
CO2 SERPL-SCNC: 26 MMOL/L (ref 20–29)
COLOR UR: YELLOW
CREAT SERPL-MCNC: 0.68 MG/DL (ref 0.57–1)
EOSINOPHIL # BLD AUTO: 0.2 X10E3/UL (ref 0–0.4)
EOSINOPHIL NFR BLD AUTO: 5 %
EPI CELLS #/AREA URNS HPF: ABNORMAL /HPF
ERYTHROCYTE [DISTWIDTH] IN BLOOD BY AUTOMATED COUNT: 13.5 % (ref 12.3–15.4)
EST. AVERAGE GLUCOSE BLD GHB EST-MCNC: 111 MG/DL
GLOBULIN SER CALC-MCNC: 2.6 G/DL (ref 1.5–4.5)
GLUCOSE SERPL-MCNC: 104 MG/DL (ref 65–99)
GLUCOSE UR QL: NEGATIVE
HBA1C MFR BLD: 5.5 % (ref 4.8–5.6)
HCT VFR BLD AUTO: 42.8 % (ref 34–46.6)
HDLC SERPL-MCNC: 94 MG/DL
HGB BLD-MCNC: 14.6 G/DL (ref 11.1–15.9)
HGB UR QL STRIP: NEGATIVE
IMM GRANULOCYTES # BLD: 0 X10E3/UL (ref 0–0.1)
IMM GRANULOCYTES NFR BLD: 0 %
INTERPRETATION, 910389: NORMAL
KETONES UR QL STRIP: ABNORMAL
LDLC SERPL CALC-MCNC: 129 MG/DL (ref 0–99)
LEUKOCYTE ESTERASE UR QL STRIP: ABNORMAL
LYMPHOCYTES # BLD AUTO: 1.5 X10E3/UL (ref 0.7–3.1)
LYMPHOCYTES NFR BLD AUTO: 30 %
MCH RBC QN AUTO: 31.1 PG (ref 26.6–33)
MCHC RBC AUTO-ENTMCNC: 34.1 G/DL (ref 31.5–35.7)
MCV RBC AUTO: 91 FL (ref 79–97)
MICRO URNS: ABNORMAL
MONOCYTES # BLD AUTO: 0.5 X10E3/UL (ref 0.1–0.9)
MONOCYTES NFR BLD AUTO: 9 %
MUCOUS THREADS URNS QL MICRO: PRESENT
NEUTROPHILS # BLD AUTO: 2.9 X10E3/UL (ref 1.4–7)
NEUTROPHILS NFR BLD AUTO: 55 %
NITRITE UR QL STRIP: POSITIVE
PH UR STRIP: 5 [PH] (ref 5–7.5)
PLATELET # BLD AUTO: 240 X10E3/UL (ref 150–379)
POTASSIUM SERPL-SCNC: 4.6 MMOL/L (ref 3.5–5.2)
PROT SERPL-MCNC: 6.9 G/DL (ref 6–8.5)
PROT UR QL STRIP: NEGATIVE
RBC # BLD AUTO: 4.7 X10E6/UL (ref 3.77–5.28)
RBC #/AREA URNS HPF: ABNORMAL /HPF
SODIUM SERPL-SCNC: 143 MMOL/L (ref 134–144)
SP GR UR: >=1.03 (ref 1–1.03)
T4 FREE SERPL-MCNC: 1.5 NG/DL (ref 0.82–1.77)
TRIGL SERPL-MCNC: 60 MG/DL (ref 0–149)
TSH SERPL DL<=0.005 MIU/L-ACNC: 1.47 UIU/ML (ref 0.45–4.5)
UROBILINOGEN UR STRIP-MCNC: 0.2 MG/DL (ref 0.2–1)
VLDLC SERPL CALC-MCNC: 12 MG/DL (ref 5–40)
WBC # BLD AUTO: 5.2 X10E3/UL (ref 3.4–10.8)
WBC #/AREA URNS HPF: ABNORMAL /HPF

## 2018-10-02 NOTE — PROGRESS NOTES
Writer called patient to notify of results and recommendations from Dr. Gary Winter. Writer spoke with patient. Patient verified . Writer notified patient of results, and that she needed to come in and do a clean catch urine culture per Dr. Gary Winetr to make sure there is no infection. Patient verbalized understanding and appreciation. Patient asked if we could check and see if it would be done at the [facility] since it is closer to where she lives. Writer told patient would check with , may have to send out lab orders if they cannot see what we place in. Patient verbalized understanding and appreciation.

## 2018-10-11 ENCOUNTER — OFFICE VISIT (OUTPATIENT)
Dept: FAMILY MEDICINE CLINIC | Age: 68
End: 2018-10-11

## 2018-10-11 VITALS
HEIGHT: 65 IN | TEMPERATURE: 97.9 F | DIASTOLIC BLOOD PRESSURE: 88 MMHG | WEIGHT: 180.3 LBS | OXYGEN SATURATION: 96 % | BODY MASS INDEX: 30.04 KG/M2 | SYSTOLIC BLOOD PRESSURE: 135 MMHG | RESPIRATION RATE: 18 BRPM | HEART RATE: 92 BPM

## 2018-10-11 DIAGNOSIS — J06.9 UPPER RESPIRATORY TRACT INFECTION, UNSPECIFIED TYPE: ICD-10-CM

## 2018-10-11 DIAGNOSIS — R82.81 PYURIA: ICD-10-CM

## 2018-10-11 DIAGNOSIS — F40.243 FEAR OF FLYING: ICD-10-CM

## 2018-10-11 DIAGNOSIS — Z71.89 ADVANCED DIRECTIVES, COUNSELING/DISCUSSION: ICD-10-CM

## 2018-10-11 DIAGNOSIS — Z00.00 MEDICARE ANNUAL WELLNESS VISIT, SUBSEQUENT: Primary | ICD-10-CM

## 2018-10-11 RX ORDER — AZITHROMYCIN 250 MG/1
500 TABLET, FILM COATED ORAL DAILY
Qty: 6 TAB | Refills: 0 | Status: SHIPPED | OUTPATIENT
Start: 2018-10-11 | End: 2018-10-14

## 2018-10-11 RX ORDER — DIAZEPAM 5 MG/1
2.5-5 TABLET ORAL
Qty: 10 TAB | Refills: 0 | Status: SHIPPED | OUTPATIENT
Start: 2018-10-11 | End: 2019-11-06 | Stop reason: ALTCHOICE

## 2018-10-11 NOTE — PROGRESS NOTES
Diane Jackson  Identified pt with two pt identifiers(name and ). Chief Complaint Patient presents with  Annual Wellness Visit  
  rm2/non fasting/hoarseness/coughing, productive, cloudy white phlegm. 1. Have you been to the ER, urgent care clinic since your last visit? no  Hospitalized since your last visit?no 2. Have you seen or consulted any other health care providers outside of the [address] [place] [address] since your last visit? Include any pap smears or colon screening. no 
 
 
Advance Care Planning In the event something were to happen to you and you were unable to speak on your behalf, do you have an Advance Directive/ Living Will in place stating your wishes? yes If yes, do we have a copy on file ? no If no, would you like information Patient has but has given copy. Medication reconciliation up to date and corrected with patient at this time. Today's provider has been notified of reason for visit, vitals and flowsheets obtained on patients. Reviewed record in preparation for visit, huddled with provider and have obtained necessary documentation. Health Maintenance Due Topic  Shingrix Vaccine Age 50> (1 of 2)  MEDICARE YEARLY EXAM   
 
 
Wt Readings from Last 3 Encounters:  
10/11/18 180 lb 4.8 oz (81.8 kg) 10/01/18 181 lb 11.2 oz (82.4 kg) 17 185 lb (83.9 kg) Temp Readings from Last 3 Encounters:  
10/11/18 97.9 °F (36.6 °C) (Oral) 10/01/18 96.3 °F (35.7 °C) (Oral) 10/06/16 97.3 °F (36.3 °C) (Oral) BP Readings from Last 3 Encounters:  
10/11/18 135/88  
10/01/18 128/62  
17 163/82 Pulse Readings from Last 3 Encounters:  
10/11/18 92  
10/01/18 83  
17 76 Vitals:  
 10/11/18 1342 BP: 135/88 Pulse: 92 Resp: 18 Temp: 97.9 °F (36.6 °C) TempSrc: Oral  
SpO2: 96% Weight: 180 lb 4.8 oz (81.8 kg) Height: 5' 5\" (1.651 m) PainSc:   0 - No pain Learning Assessment: 
:  
 
 Learning Assessment 10/11/2018 10/1/2018 7/1/2014 PRIMARY LEARNER Patient Patient Patient HIGHEST LEVEL OF EDUCATION - PRIMARY LEARNER  2 YEARS OF COLLEGE 2 YEARS OF COLLEGE 2 YEARS OF COLLEGE  
BARRIERS PRIMARY LEARNER - - VISUAL  
CO-LEARNER CAREGIVER - - No  
PRIMARY LANGUAGE ENGLISH ENGLISH ENGLISH  
LEARNER PREFERENCE PRIMARY DEMONSTRATION DEMONSTRATION DEMONSTRATION  
ANSWERED BY patient Patient patient RELATIONSHIP SELF SELF SELF Depression Screening: 
:  
 
PHQ over the last two weeks 10/11/2018 Little interest or pleasure in doing things Not at all Feeling down, depressed, irritable, or hopeless Not at all Total Score PHQ 2 0 Fall Risk Assessment: 
:  
 
Fall Risk Assessment, last 12 mths 10/11/2018 Able to walk? Yes Fall in past 12 months? No  
Fall with injury? -  
Number of falls in past 12 months - Fall Risk Score -  
 
 
Abuse Screening: 
:  
 
Abuse Screening Questionnaire 10/11/2018 10/1/2018 Do you ever feel afraid of your partner? Lonne Leyden Are you in a relationship with someone who physically or mentally threatens you? Lonne Leyden Is it safe for you to go home? Y Y  
 
 
ADL Screening: 
:  
 
ADL Assessment 10/11/2018 Feeding yourself No Help Needed Getting from bed to chair No Help Needed Getting dressed No Help Needed Bathing or showering No Help Needed Walk across the room (includes cane/walker) No Help Needed Using the telphone No Help Needed Taking your medications No Help Needed Preparing meals No Help Needed Managing money (expenses/bills) No Help Needed Moderately strenuous housework (laundry) No Help Needed Shopping for personal items (toiletries/medicines) No Help Needed Shopping for groceries No Help Needed Driving No Help Needed Climbing a flight of stairs No Help Needed Getting to places beyond walking distances No Help Needed Medication reconciliation up to date and corrected with patient at this time.

## 2018-10-11 NOTE — MR AVS SNAPSHOT
[facility] 
 
 
 [address] 
[address] [place] [address] 
[phone] Patient: Simon Moreno MRN:  [id number] Visit Information Date & Time Provider Department Dept. Phone Encounter #  
 10/11/2018  1:40 PM Isaiah Bateman MD [facility] [phone] [id number] Upcoming Health Maintenance Date Due Shingrix Vaccine Age 50> (1 of 2) 3/15/1998 MEDICARE YEARLY EXAM 5/9/2018 BREAST CANCER SCRN MAMMOGRAM 2/19/2019 GLAUCOMA SCREENING Q2Y 12/14/2019 COLONOSCOPY 2/27/2020 DTaP/Tdap/Td series (4 - Td) 10/1/2028 Allergies as of 10/11/2018  Review Complete On: 10/11/2018 By: Lei Santos Severity Noted Reaction Type Reactions Augmentin [Amoxicillin-pot Clavulanate]  03/22/2010    Nausea Only Current Immunizations  Reviewed on 10/11/2018 Name Date Influenza High Dose Vaccine PF 9/10/2018, 10/6/2016 11:20 AM, 11/15/2015 Influenza Vaccine 10/10/2012 Influenza Vaccine Split 9/22/2010 MMR Vaccine 11/13/2007 Pneumococcal Conjugate (PCV-13) 2/2/2018, 5/4/2016 11:41 AM  
 Pneumococcal Polysaccharide (PPSV-23) 5/8/2017 TD Vaccine 3/13/2010 TDAP Vaccine 11/13/2007 Td, Adsorbed 10/1/2018 ZZZ-RETIRED (DO NOT USE) Pneumococcal Vaccine (Unspecified Type) 1/1/2001, 1/1/1983 Reviewed by Lei Santos on 10/11/2018 at  1:41 PM  
You Were Diagnosed With   
  
 Codes Comments Medicare annual wellness visit, subsequent    -  Primary ICD-10-CM: Z00.00 ICD-9-CM: V70.0 Advanced directives, counseling/discussion     ICD-10-CM: Z71.89 ICD-9-CM: V65.49 Fear of flying     ICD-10-CM: M07.104 ICD-9-CM: 300.29 Upper respiratory tract infection, unspecified type     ICD-10-CM: J06.9 ICD-9-CM: 465.9 Pyuria     ICD-10-CM: N39.0 ICD-9-CM: 791.9 Vitals BP Pulse Temp Resp Height(growth percentile) Weight(growth percentile) 135/88 (BP 1 Location: Left arm, BP Patient Position: Sitting) 92 97.9 °F (36.6 °C) (Oral) 18 5' 5\" (1.651 m) 180 lb 4.8 oz (81.8 kg) SpO2 BMI OB Status Smoking Status 96% 30 kg/m2 Postmenopausal Current Some Day Smoker BMI and BSA Data Body Mass Index Body Surface Area  
 30 kg/m 2 1.94 m 2 Preferred Pharmacy Pharmacy Name Phone [facility] #[id number][1] [id number] [facility] AT [address] [place] [address] [phone] Your Updated Medication List  
  
   
This list is accurate as of 10/11/18  2:11 PM.  Always use your most recent med list.  
  
  
  
  
 azithromycin 250 mg tablet Commonly known as:  Benson Ache Take 2 Tabs by mouth daily for 3 days. CALCIUM 600 PO Take 1 Tab by mouth nightly. cholecalciferol 1,000 unit Cap Commonly known as:  VITAMIN D3 Take 1,000 Units by mouth daily. diazePAM 5 mg tablet Commonly known as:  VALIUM Take 0.5-1 Tabs by mouth daily as needed for Anxiety. levothyroxine 75 mcg tablet Commonly known as:  SYNTHROID  
TAKE 1 TABLET BY MOUTH EVERY DAY BEFORE BREAKFAST  
  
 multivitamin tablet Commonly known as:  ONE A DAY Take 1 Tab by mouth daily. pravastatin 20 mg tablet Commonly known as:  PRAVACHOL  
TAKE 1 TABLET BY MOUTH NIGHTLY PROBIOTIC (B. COAGULANS) 10 billion cell Cpdr  
Generic drug:  Bacillus coagulans Take 1 Cap by mouth every other day. varicella-zoster recombinant (PF) 50 mcg/0.5 mL Susr injection Commonly known as:  SHINGRIX (PF)  
0.5 mL by IntraMUSCular route once for 1 dose. Prescriptions Printed Refills  
 varicella-zoster recombinant, PF, (SHINGRIX, PF,) 50 mcg/0.5 mL susr injection 1 Si.5 mL by IntraMUSCular route once for 1 dose. Class: Print Route: IntraMUSCular  
 diazePAM (VALIUM) 5 mg tablet 0 Sig: Take 0.5-1 Tabs by mouth daily as needed for Anxiety. Class: Print  Route: Oral  
  
 Prescriptions Sent to Pharmacy Refills  
 azithromycin (ZITHROMAX) 250 mg tablet 0 Sig: Take 2 Tabs by mouth daily for 3 days. Class: Normal  
 Pharmacy: [facility]/pharmacy #[id number] [address] [address] AT [address] [place] [address] #: [phone] Route: Oral  
  
We Performed the Following CULTURE, URINE [id number] CPT(R)] Patient Instructions Medicare Wellness Visit, Female The best way to live healthy is to have a lifestyle where you eat a well-balanced diet, exercise regularly, limit alcohol use, and quit all forms of tobacco/nicotine, if applicable. Regular preventive services are another way to keep healthy. Preventive services (vaccines, screening tests, monitoring & exams) can help personalize your care plan, which helps you manage your own care. Screening tests can find health problems at the earliest stages, when they are easiest to treat. [facility] follows the current, evidence-based guidelines published by the [facility] [place] (USPSTF) when recommending preventive services for our patients. Because we follow these guidelines, sometimes recommendations change over time as research supports it. (For example, mammograms used to be recommended annually. Even though Medicare will still pay for an annual mammogram, the newer guidelines recommend a mammogram every two years for women of average risk.) Of course, you and your doctor may decide to screen more often for some diseases, based on your risk and your health status. Preventive services for you include: - Medicare offers their members a free annual wellness visit, which is time for you and your primary care provider to discuss and plan for your preventive service needs. Take advantage of this benefit every year! 
-All adults over the age of 72 should receive the recommended pneumonia vaccines.  Current USPSTF guidelines recommend a series of two vaccines for the best pneumonia protection.  
-All adults should have a flu vaccine yearly and a tetanus vaccine every 10 years. All adults age 61 and older should receive a shingles vaccine once in their lifetime.   
-A bone mass density test is recommended when a woman turns 65 to screen for osteoporosis. This test is only recommended one time, as a screening. Some providers will use this same test as a disease monitoring tool if you already have osteoporosis. -All adults age 38-68 who are overweight should have a diabetes screening test once every three years.  
-Other screening tests and preventive services for persons with diabetes include: an eye exam to screen for diabetic retinopathy, a kidney function test, a foot exam, and stricter control over your cholesterol.  
-Cardiovascular screening for adults with routine risk involves an electrocardiogram (ECG) at intervals determined by your doctor.  
-Colorectal cancer screenings should be done for adults age 54-65 with no increased risk factors for colorectal cancer. There are a number of acceptable methods of screening for this type of cancer. Each test has its own benefits and drawbacks. Discuss with your doctor what is most appropriate for you during your annual wellness visit. The different tests include: colonoscopy (considered the best screening method), a fecal occult blood test, a fecal DNA test, and sigmoidoscopy. -Breast cancer screenings are recommended every other year for women of normal risk, age 54-69. 
-Cervical cancer screenings for women over age 72 are only recommended with certain risk factors.  
-All adults born between [facility] should be screened once for Hepatitis C. Here is a list of your current Health Maintenance items (your personalized list of preventive services) with a due date: 
Health Maintenance Due Topic Date Due  Shingles Vaccine (1 of 2) 03/15/1998 Hilario Annual Well Visit  05/09/2018 Introducing [facility]! Dear Bethany Junior: Thank you for requesting a IMANIN account. Our records indicate that you already have an active IMANIN account. You can access your account anytime at [email or web address] Did you know that you can access your hospital and ER discharge instructions at any time in IMANIN? You can also review all of your test results from your hospital stay or ER visit. Additional Information If you have questions, please visit the Frequently Asked Questions section of the IMANIN website at [email or web address] Remember, IMANIN is NOT to be used for urgent needs. For medical emergencies, dial 911. Now available from your iPhone and Android! Please provide this summary of care documentation to your next provider. Your primary care clinician is listed as [id number] If you have any questions after today's visit, please call [phone].

## 2018-10-11 NOTE — ACP (ADVANCE CARE PLANNING)
Advance Care Planning    Advance Care Planning (ACP) Provider Conversation Snapshot    Date of ACP Conversation: 10/11/18  Persons included in Conversation:  patient  Length of ACP Conversation in minutes:  <16 minutes (Non-Billable)    Authorized Decision Maker (if patient is incapable of making informed decisions): This person is:    Other Legally Authorized Decision Maker (e.g. Next of Kin)  Daughter-Aviva        For Patients with Decision Making Capacity:   Values/Goals: Exploration of values, goals, and preferences if recovery is not expected, even with continued medical treatment in the event of:  Imminent death  Severe, permanent brain injury    Conversation Outcomes / Follow-Up Plan:   Recommended completion of Advance Directive form after review of ACP materials and conversation with prospective healthcare agent
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
## 2018-10-11 NOTE — PATIENT INSTRUCTIONS
Medicare Wellness Visit, Female The best way to live healthy is to have a lifestyle where you eat a well-balanced diet, exercise regularly, limit alcohol use, and quit all forms of tobacco/nicotine, if applicable. Regular preventive services are another way to keep healthy. Preventive services (vaccines, screening tests, monitoring & exams) can help personalize your care plan, which helps you manage your own care. Screening tests can find health problems at the earliest stages, when they are easiest to treat. [facility] follows the current, evidence-based guidelines published by the [facility] [place] ([facility]) when recommending preventive services for our patients. Because we follow these guidelines, sometimes recommendations change over time as research supports it. (For example, mammograms used to be recommended annually. Even though Medicare will still pay for an annual mammogram, the newer guidelines recommend a mammogram every two years for women of average risk.) Of course, you and your doctor may decide to screen more often for some diseases, based on your risk and your health status. Preventive services for you include: - Medicare offers their members a free annual wellness visit, which is time for you and your primary care provider to discuss and plan for your preventive service needs. Take advantage of this benefit every year! 
-All adults over the age of 72 should receive the recommended pneumonia vaccines. Current USPSTF guidelines recommend a series of two vaccines for the best pneumonia protection.  
-All adults should have a flu vaccine yearly and a tetanus vaccine every 10 years. All adults age 61 and older should receive a shingles vaccine once in their lifetime.   
-A bone mass density test is recommended when a woman turns 65 to screen for osteoporosis. This test is only recommended one time, as a screening. Some providers will use this same test as a disease monitoring tool if you already have osteoporosis. -All adults age 38-68 who are overweight should have a diabetes screening test once every three years.  
-Other screening tests and preventive services for persons with diabetes include: an eye exam to screen for diabetic retinopathy, a kidney function test, a foot exam, and stricter control over your cholesterol.  
-Cardiovascular screening for adults with routine risk involves an electrocardiogram (ECG) at intervals determined by your doctor.  
-Colorectal cancer screenings should be done for adults age 54-65 with no increased risk factors for colorectal cancer. There are a number of acceptable methods of screening for this type of cancer. Each test has its own benefits and drawbacks. Discuss with your doctor what is most appropriate for you during your annual wellness visit. The different tests include: colonoscopy (considered the best screening method), a fecal occult blood test, a fecal DNA test, and sigmoidoscopy. -Breast cancer screenings are recommended every other year for women of normal risk, age 54-69. 
-Cervical cancer screenings for women over age 72 are only recommended with certain risk factors.  
-All adults born between [facility] should be screened once for Hepatitis C. Here is a list of your current Health Maintenance items (your personalized list of preventive services) with a due date: 
Health Maintenance Due Topic Date Due  Shingles Vaccine (1 of 2) 03/15/1998 [address] [place] [address] Annual Well Visit  05/09/2018

## 2018-10-11 NOTE — PROGRESS NOTES
Fear of flying has returned. Took Valium in past with benefit. Has some cloudy mucus. Sxs past 2-3 weeks. Denies any urinary sxs. Will send cx. Visit Vitals  /88 (BP 1 Location: Left arm, BP Patient Position: Sitting)  Pulse 92  Temp 97.9 °F (36.6 °C) (Oral)  Resp 18  Ht 5' 5\" (1.651 m)  Wt 180 lb 4.8 oz (81.8 kg)  SpO2 96%  BMI 30 kg/m2 Patient alert and cooperative. Reviewed above. Assessment: 1. Fear of flying. Plan: 1. Refilled the Valium, which she's taken in the past with benefit, 5 mg, take half to one as needed for plane flights, (#10) given. 2. Follow otherwise here prn. Lungs completely clear. Tx Zpak.

## 2018-10-13 LAB — BACTERIA UR CULT: NORMAL

## 2018-10-15 ENCOUNTER — TELEPHONE (OUTPATIENT)
Dept: FAMILY MEDICINE CLINIC | Age: 68
End: 2018-10-15

## 2018-10-15 NOTE — TELEPHONE ENCOUNTER
----- Message from Ni Velez sent at 10/15/2018  4:16 PM EDT -----  Regarding: Hayes Gil MD   Pt is returning a call to [facility]. pt states that she missed a call and does not know what it might be in reference to.  Pt contact [phone]

## 2018-10-17 ENCOUNTER — TELEPHONE (OUTPATIENT)
Dept: FAMILY MEDICINE CLINIC | Age: 68
End: 2018-10-17

## 2019-09-18 NOTE — TELEPHONE ENCOUNTER
----- Message from Radha Morgan sent at 9/18/2019 12:11 PM EDT -----  Regarding: Dr. Brenton Alexis Message/Vendor Calls    Caller's first and last name:Patient       Reason for call: pt would like to know if she can be prescribed a medication to help control anxiety but not a controlled substance.        Callback required yes/no and why: yes       Best contact number(s): [phone]      Details to clarify the request:      Radha Morgan

## 2019-09-18 NOTE — TELEPHONE ENCOUNTER
Writer called patient back regarding anxiety and medication. writer spoke with patient. Patient verified . Writer notified patient that she will need an appointment to discuss. Patient stated that she does not want to come up to office unless she absolutely has to due to the drive from her house. Patient stated that her anxiety goes up when she has to fly out of state to her daughter and granddaughter, or has to fly overseas. Patient wants to know if Dr. Talita England can prescribe something non-controlled that she can take when they hit turbulence or bad weather. Writer verbalized understanding and stated that message would be sent to Dr. Talita England, and she will be given a phone call back once he reviews and gets back with writer. Patient verbalized understanding and appreciation.

## 2019-09-19 RX ORDER — BUSPIRONE HYDROCHLORIDE 10 MG/1
TABLET ORAL
Qty: 20 TAB | Refills: 0 | Status: SHIPPED | OUTPATIENT
Start: 2019-09-19 | End: 2019-10-12 | Stop reason: SDUPTHER

## 2019-09-21 DIAGNOSIS — E03.9 HYPOTHYROIDISM, UNSPECIFIED TYPE: ICD-10-CM

## 2019-09-23 RX ORDER — LEVOTHYROXINE SODIUM 75 UG/1
TABLET ORAL
Qty: 30 TAB | Refills: 0 | Status: SHIPPED | OUTPATIENT
Start: 2019-09-23 | End: 2019-10-21 | Stop reason: SDUPTHER

## 2019-09-23 NOTE — TELEPHONE ENCOUNTER
PCP: Loraine Fink MD    Last appt: 10/11/2018  No future appointments.     Requested Prescriptions     Pending Prescriptions Disp Refills    levothyroxine (SYNTHROID) 75 mcg tablet [Pharmacy Med Name: LEVOTHYROXINE 75 MCG TABLET] 90 Tab 3     Sig: TAKE 1 TABLET BY MOUTH EVERY DAY BEFORE BREAKFAST       Prior labs and Blood pressures:  BP Readings from Last 3 Encounters:   10/11/18 135/88   10/01/18 128/62   05/08/17 163/82     Lab Results   Component Value Date/Time    Sodium 143 10/01/2018 10:38 AM    Potassium 4.6 10/01/2018 10:38 AM    Chloride 103 10/01/2018 10:38 AM    CO2 26 10/01/2018 10:38 AM    Anion gap 12 09/22/2010 09:41 AM    Glucose 104 (H) 10/01/2018 10:38 AM    BUN 18 10/01/2018 10:38 AM    Creatinine 0.68 10/01/2018 10:38 AM    BUN/Creatinine ratio 26 10/01/2018 10:38 AM    GFR est  10/01/2018 10:38 AM    GFR est non-AA 89 10/01/2018 10:38 AM    Calcium 9.3 10/01/2018 10:38 AM     Lab Results   Component Value Date/Time    Hemoglobin A1c 5.5 10/01/2018 10:38 AM     Lab Results   Component Value Date/Time    Cholesterol, total 235 (H) 10/01/2018 10:38 AM    HDL Cholesterol 94 10/01/2018 10:38 AM    LDL, calculated 129 (H) 10/01/2018 10:38 AM    VLDL, calculated 12 10/01/2018 10:38 AM    Triglyceride 60 10/01/2018 10:38 AM    CHOL/HDL Ratio 2.7 09/22/2010 09:41 AM     Lab Results   Component Value Date/Time    Vitamin D 25-Hydroxy 33.6 12/28/2010 08:15 AM    VITAMIN D, 25-HYDROXY 47.9 10/01/2018 10:38 AM       Lab Results   Component Value Date/Time    TSH 1.470 10/01/2018 10:38 AM

## 2019-09-24 ENCOUNTER — TELEPHONE (OUTPATIENT)
Dept: FAMILY MEDICINE CLINIC | Age: 69
End: 2019-09-24

## 2019-09-24 NOTE — TELEPHONE ENCOUNTER
NP from [facility] called into office regarding patient and annual house visit they do. Writer spoke with NP. Patient's name and  verified. NP stated that doppler was performed to assess PAD, on patient and results came back with Mild PAD in both LE's. NP calling to notify Dr. Kenia Bush, patient was made aware for results yesterday during visit and was encouraged to call office. Writer verbalized understanding and appreciation.

## 2019-10-04 NOTE — TELEPHONE ENCOUNTER
PCP: Estrellita Montero MD    Last appt: 10/11/2018  No future appointments.     Requested Prescriptions     Pending Prescriptions Disp Refills    pravastatin (PRAVACHOL) 20 mg tablet [Pharmacy Med Name: PRAVASTATIN SODIUM 20 MG TAB] 90 Tab 3     Sig: TAKE 1 TABLET BY MOUTH NIGHTLY       Prior labs and Blood pressures:  BP Readings from Last 3 Encounters:   10/11/18 135/88   10/01/18 128/62   05/08/17 163/82     Lab Results   Component Value Date/Time    Sodium 143 10/01/2018 10:38 AM    Potassium 4.6 10/01/2018 10:38 AM    Chloride 103 10/01/2018 10:38 AM    CO2 26 10/01/2018 10:38 AM    Anion gap 12 09/22/2010 09:41 AM    Glucose 104 (H) 10/01/2018 10:38 AM    BUN 18 10/01/2018 10:38 AM    Creatinine 0.68 10/01/2018 10:38 AM    BUN/Creatinine ratio 26 10/01/2018 10:38 AM    GFR est  10/01/2018 10:38 AM    GFR est non-AA 89 10/01/2018 10:38 AM    Calcium 9.3 10/01/2018 10:38 AM     Lab Results   Component Value Date/Time    Hemoglobin A1c 5.5 10/01/2018 10:38 AM     Lab Results   Component Value Date/Time    Cholesterol, total 235 (H) 10/01/2018 10:38 AM    HDL Cholesterol 94 10/01/2018 10:38 AM    LDL, calculated 129 (H) 10/01/2018 10:38 AM    VLDL, calculated 12 10/01/2018 10:38 AM    Triglyceride 60 10/01/2018 10:38 AM    CHOL/HDL Ratio 2.7 09/22/2010 09:41 AM     Lab Results   Component Value Date/Time    Vitamin D 25-Hydroxy 33.6 12/28/2010 08:15 AM    VITAMIN D, 25-HYDROXY 47.9 10/01/2018 10:38 AM       Lab Results   Component Value Date/Time    TSH 1.470 10/01/2018 10:38 AM

## 2019-10-07 RX ORDER — PRAVASTATIN SODIUM 20 MG/1
TABLET ORAL
Qty: 30 TAB | Refills: 0 | Status: SHIPPED | OUTPATIENT
Start: 2019-10-07 | End: 2019-11-06 | Stop reason: SDUPTHER

## 2019-10-14 RX ORDER — BUSPIRONE HYDROCHLORIDE 10 MG/1
TABLET ORAL
Qty: 20 TAB | Refills: 0 | Status: SHIPPED | OUTPATIENT
Start: 2019-10-14 | End: 2019-11-06 | Stop reason: SDUPTHER

## 2019-10-14 NOTE — TELEPHONE ENCOUNTER
PCP: Hu Hayden MD    Last appt: 10/11/2018  No future appointments.     Requested Prescriptions     Pending Prescriptions Disp Refills    busPIRone (BUSPAR) 10 mg tablet [Pharmacy Med Name: BUSPIRONE HCL 10 MG TABLET] 20 Tab 0     Sig: TAKE 0.5-2 TABLETS BY MOUTH PRIOR TO TRAVEL AS NEEDED       Prior labs and Blood pressures:  BP Readings from Last 3 Encounters:   10/11/18 135/88   10/01/18 128/62   05/08/17 163/82     Lab Results   Component Value Date/Time    Sodium 143 10/01/2018 10:38 AM    Potassium 4.6 10/01/2018 10:38 AM    Chloride 103 10/01/2018 10:38 AM    CO2 26 10/01/2018 10:38 AM    Anion gap 12 09/22/2010 09:41 AM    Glucose 104 (H) 10/01/2018 10:38 AM    BUN 18 10/01/2018 10:38 AM    Creatinine 0.68 10/01/2018 10:38 AM    BUN/Creatinine ratio 26 10/01/2018 10:38 AM    GFR est  10/01/2018 10:38 AM    GFR est non-AA 89 10/01/2018 10:38 AM    Calcium 9.3 10/01/2018 10:38 AM     Lab Results   Component Value Date/Time    Hemoglobin A1c 5.5 10/01/2018 10:38 AM     Lab Results   Component Value Date/Time    Cholesterol, total 235 (H) 10/01/2018 10:38 AM    HDL Cholesterol 94 10/01/2018 10:38 AM    LDL, calculated 129 (H) 10/01/2018 10:38 AM    VLDL, calculated 12 10/01/2018 10:38 AM    Triglyceride 60 10/01/2018 10:38 AM    CHOL/HDL Ratio 2.7 09/22/2010 09:41 AM     Lab Results   Component Value Date/Time    Vitamin D 25-Hydroxy 33.6 12/28/2010 08:15 AM    VITAMIN D, 25-HYDROXY 47.9 10/01/2018 10:38 AM       Lab Results   Component Value Date/Time    TSH 1.470 10/01/2018 10:38 AM

## 2019-10-16 DIAGNOSIS — E03.9 HYPOTHYROIDISM, UNSPECIFIED TYPE: ICD-10-CM

## 2019-10-17 RX ORDER — BUSPIRONE HYDROCHLORIDE 10 MG/1
TABLET ORAL
Qty: 20 TAB | Refills: 0 | OUTPATIENT
Start: 2019-10-17

## 2019-10-17 RX ORDER — LEVOTHYROXINE SODIUM 75 UG/1
TABLET ORAL
Qty: 30 TAB | Refills: 0 | OUTPATIENT
Start: 2019-10-17

## 2019-10-17 NOTE — TELEPHONE ENCOUNTER
PCP: Any Erickson MD    Last appt: 10/11/2018  No future appointments.     Requested Prescriptions     Pending Prescriptions Disp Refills    levothyroxine (SYNTHROID) 75 mcg tablet [Pharmacy Med Name: LEVOTHYROXINE 75 MCG TABLET] 30 Tab 0     Sig: TAKE 1 TABLET BY MOUTH EVERY DAY BEFORE BREAKFAST       Prior labs and Blood pressures:  BP Readings from Last 3 Encounters:   10/11/18 135/88   10/01/18 128/62   05/08/17 163/82     Lab Results   Component Value Date/Time    Sodium 143 10/01/2018 10:38 AM    Potassium 4.6 10/01/2018 10:38 AM    Chloride 103 10/01/2018 10:38 AM    CO2 26 10/01/2018 10:38 AM    Anion gap 12 09/22/2010 09:41 AM    Glucose 104 (H) 10/01/2018 10:38 AM    BUN 18 10/01/2018 10:38 AM    Creatinine 0.68 10/01/2018 10:38 AM    BUN/Creatinine ratio 26 10/01/2018 10:38 AM    GFR est  10/01/2018 10:38 AM    GFR est non-AA 89 10/01/2018 10:38 AM    Calcium 9.3 10/01/2018 10:38 AM     Lab Results   Component Value Date/Time    Hemoglobin A1c 5.5 10/01/2018 10:38 AM     Lab Results   Component Value Date/Time    Cholesterol, total 235 (H) 10/01/2018 10:38 AM    HDL Cholesterol 94 10/01/2018 10:38 AM    LDL, calculated 129 (H) 10/01/2018 10:38 AM    VLDL, calculated 12 10/01/2018 10:38 AM    Triglyceride 60 10/01/2018 10:38 AM    CHOL/HDL Ratio 2.7 09/22/2010 09:41 AM     Lab Results   Component Value Date/Time    Vitamin D 25-Hydroxy 33.6 12/28/2010 08:15 AM    VITAMIN D, 25-HYDROXY 47.9 10/01/2018 10:38 AM       Lab Results   Component Value Date/Time    TSH 1.470 10/01/2018 10:38 AM

## 2019-10-17 NOTE — TELEPHONE ENCOUNTER
PCP: Jayashree Kendall MD    Last appt: 10/11/2018  No future appointments.     Requested Prescriptions     Pending Prescriptions Disp Refills    busPIRone (BUSPAR) 10 mg tablet [Pharmacy Med Name: BUSPIRONE HCL 10 MG TABLET] 20 Tab 0     Sig: TAKE 0.5-2 TABLETS BY MOUTH PRIOR TO TRAVEL AS NEEDED       Prior labs and Blood pressures:  BP Readings from Last 3 Encounters:   10/11/18 135/88   10/01/18 128/62   05/08/17 163/82     Lab Results   Component Value Date/Time    Sodium 143 10/01/2018 10:38 AM    Potassium 4.6 10/01/2018 10:38 AM    Chloride 103 10/01/2018 10:38 AM    CO2 26 10/01/2018 10:38 AM    Anion gap 12 09/22/2010 09:41 AM    Glucose 104 (H) 10/01/2018 10:38 AM    BUN 18 10/01/2018 10:38 AM    Creatinine 0.68 10/01/2018 10:38 AM    BUN/Creatinine ratio 26 10/01/2018 10:38 AM    GFR est  10/01/2018 10:38 AM    GFR est non-AA 89 10/01/2018 10:38 AM    Calcium 9.3 10/01/2018 10:38 AM     Lab Results   Component Value Date/Time    Hemoglobin A1c 5.5 10/01/2018 10:38 AM     Lab Results   Component Value Date/Time    Cholesterol, total 235 (H) 10/01/2018 10:38 AM    HDL Cholesterol 94 10/01/2018 10:38 AM    LDL, calculated 129 (H) 10/01/2018 10:38 AM    VLDL, calculated 12 10/01/2018 10:38 AM    Triglyceride 60 10/01/2018 10:38 AM    CHOL/HDL Ratio 2.7 09/22/2010 09:41 AM     Lab Results   Component Value Date/Time    Vitamin D 25-Hydroxy 33.6 12/28/2010 08:15 AM    VITAMIN D, 25-HYDROXY 47.9 10/01/2018 10:38 AM       Lab Results   Component Value Date/Time    TSH 1.470 10/01/2018 10:38 AM

## 2019-10-21 DIAGNOSIS — E03.9 HYPOTHYROIDISM, UNSPECIFIED TYPE: ICD-10-CM

## 2019-10-22 RX ORDER — LEVOTHYROXINE SODIUM 75 UG/1
TABLET ORAL
Qty: 30 TAB | Refills: 0 | Status: SHIPPED | OUTPATIENT
Start: 2019-10-22 | End: 2019-11-06 | Stop reason: SDUPTHER

## 2019-10-22 NOTE — TELEPHONE ENCOUNTER
PCP: Edwin Waters MD    Last appt: 10/11/2018  No future appointments.     Requested Prescriptions     Pending Prescriptions Disp Refills    levothyroxine (SYNTHROID) 75 mcg tablet [Pharmacy Med Name: LEVOTHYROXINE 75 MCG TABLET] 30 Tab 0     Sig: TAKE 1 TABLET BY MOUTH EVERY DAY BEFORE BREAKFAST       Prior labs and Blood pressures:  BP Readings from Last 3 Encounters:   10/11/18 135/88   10/01/18 128/62   05/08/17 163/82     Lab Results   Component Value Date/Time    Sodium 143 10/01/2018 10:38 AM    Potassium 4.6 10/01/2018 10:38 AM    Chloride 103 10/01/2018 10:38 AM    CO2 26 10/01/2018 10:38 AM    Anion gap 12 09/22/2010 09:41 AM    Glucose 104 (H) 10/01/2018 10:38 AM    BUN 18 10/01/2018 10:38 AM    Creatinine 0.68 10/01/2018 10:38 AM    BUN/Creatinine ratio 26 10/01/2018 10:38 AM    GFR est  10/01/2018 10:38 AM    GFR est non-AA 89 10/01/2018 10:38 AM    Calcium 9.3 10/01/2018 10:38 AM     Lab Results   Component Value Date/Time    Hemoglobin A1c 5.5 10/01/2018 10:38 AM     Lab Results   Component Value Date/Time    Cholesterol, total 235 (H) 10/01/2018 10:38 AM    HDL Cholesterol 94 10/01/2018 10:38 AM    LDL, calculated 129 (H) 10/01/2018 10:38 AM    VLDL, calculated 12 10/01/2018 10:38 AM    Triglyceride 60 10/01/2018 10:38 AM    CHOL/HDL Ratio 2.7 09/22/2010 09:41 AM     Lab Results   Component Value Date/Time    Vitamin D 25-Hydroxy 33.6 12/28/2010 08:15 AM    VITAMIN D, 25-HYDROXY 47.9 10/01/2018 10:38 AM       Lab Results   Component Value Date/Time    TSH 1.470 10/01/2018 10:38 AM

## 2019-10-25 DIAGNOSIS — E03.9 HYPOTHYROIDISM, UNSPECIFIED TYPE: ICD-10-CM

## 2019-10-25 NOTE — TELEPHONE ENCOUNTER
PCP: Leroy Dietz MD    Last appt: 10/11/2018  Future Appointments   Date Time Provider Rosmery Cates   11/6/2019 12:00 PM Mary Zacarias MD [facility]       Requested Prescriptions     Pending Prescriptions Disp Refills    levothyroxine (SYNTHROID) 75 mcg tablet [Pharmacy Med Name: LEVOTHYROXINE 75 MCG TABLET] 30 Tab 0     Sig: TAKE 1 TABLET BY MOUTH EVERY DAY BEFORE BREAKFAST       Prior labs and Blood pressures:  BP Readings from Last 3 Encounters:   10/11/18 135/88   10/01/18 128/62   05/08/17 163/82     Lab Results   Component Value Date/Time    Sodium 143 10/01/2018 10:38 AM    Potassium 4.6 10/01/2018 10:38 AM    Chloride 103 10/01/2018 10:38 AM    CO2 26 10/01/2018 10:38 AM    Anion gap 12 09/22/2010 09:41 AM    Glucose 104 (H) 10/01/2018 10:38 AM    BUN 18 10/01/2018 10:38 AM    Creatinine 0.68 10/01/2018 10:38 AM    BUN/Creatinine ratio 26 10/01/2018 10:38 AM    GFR est  10/01/2018 10:38 AM    GFR est non-AA 89 10/01/2018 10:38 AM    Calcium 9.3 10/01/2018 10:38 AM     Lab Results   Component Value Date/Time    Hemoglobin A1c 5.5 10/01/2018 10:38 AM     Lab Results   Component Value Date/Time    Cholesterol, total 235 (H) 10/01/2018 10:38 AM    HDL Cholesterol 94 10/01/2018 10:38 AM    LDL, calculated 129 (H) 10/01/2018 10:38 AM    VLDL, calculated 12 10/01/2018 10:38 AM    Triglyceride 60 10/01/2018 10:38 AM    CHOL/HDL Ratio 2.7 09/22/2010 09:41 AM     Lab Results   Component Value Date/Time    Vitamin D 25-Hydroxy 33.6 12/28/2010 08:15 AM    VITAMIN D, 25-HYDROXY 47.9 10/01/2018 10:38 AM       Lab Results   Component Value Date/Time    TSH 1.470 10/01/2018 10:38 AM

## 2019-10-29 RX ORDER — LEVOTHYROXINE SODIUM 75 UG/1
TABLET ORAL
Qty: 30 TAB | Refills: 0 | OUTPATIENT
Start: 2019-10-29

## 2019-11-06 ENCOUNTER — OFFICE VISIT (OUTPATIENT)
Dept: FAMILY MEDICINE CLINIC | Age: 69
End: 2019-11-06

## 2019-11-06 VITALS
HEIGHT: 65 IN | SYSTOLIC BLOOD PRESSURE: 140 MMHG | DIASTOLIC BLOOD PRESSURE: 80 MMHG | TEMPERATURE: 97.3 F | WEIGHT: 162.7 LBS | RESPIRATION RATE: 20 BRPM | OXYGEN SATURATION: 98 % | HEART RATE: 68 BPM | BODY MASS INDEX: 27.11 KG/M2

## 2019-11-06 DIAGNOSIS — E03.9 ACQUIRED HYPOTHYROIDISM: ICD-10-CM

## 2019-11-06 DIAGNOSIS — R82.81 PYURIA: ICD-10-CM

## 2019-11-06 DIAGNOSIS — F40.243 FEAR OF FLYING: ICD-10-CM

## 2019-11-06 DIAGNOSIS — R73.09 INCREASED GLUCOSE LEVEL: ICD-10-CM

## 2019-11-06 DIAGNOSIS — Z00.00 MEDICARE ANNUAL WELLNESS VISIT, SUBSEQUENT: Primary | ICD-10-CM

## 2019-11-06 DIAGNOSIS — E03.9 HYPOTHYROIDISM, UNSPECIFIED TYPE: ICD-10-CM

## 2019-11-06 DIAGNOSIS — J38.3 SPASTIC DYSPHONIA: ICD-10-CM

## 2019-11-06 DIAGNOSIS — Z71.89 ADVANCED DIRECTIVES, COUNSELING/DISCUSSION: ICD-10-CM

## 2019-11-06 DIAGNOSIS — E55.9 VITAMIN D DEFICIENCY: ICD-10-CM

## 2019-11-06 DIAGNOSIS — E78.00 PURE HYPERCHOLESTEROLEMIA: ICD-10-CM

## 2019-11-06 DIAGNOSIS — R73.03 PREDIABETES: ICD-10-CM

## 2019-11-06 RX ORDER — BUSPIRONE HYDROCHLORIDE 10 MG/1
TABLET ORAL
Qty: 30 TAB | Refills: 0 | Status: SHIPPED | OUTPATIENT
Start: 2019-11-06 | End: 2020-10-20 | Stop reason: SDUPTHER

## 2019-11-06 RX ORDER — PRAVASTATIN SODIUM 20 MG/1
TABLET ORAL
Qty: 90 TAB | Refills: 3 | Status: SHIPPED | OUTPATIENT
Start: 2019-11-06 | End: 2020-10-21 | Stop reason: SDUPTHER

## 2019-11-06 RX ORDER — LEVOTHYROXINE SODIUM 75 UG/1
TABLET ORAL
Qty: 90 TAB | Refills: 3 | Status: SHIPPED | OUTPATIENT
Start: 2019-11-06 | End: 2020-12-02 | Stop reason: SDUPTHER

## 2019-11-06 NOTE — ACP (ADVANCE CARE PLANNING)
Advance Care Planning    Advance Care Planning (ACP) Provider Conversation Snapshot    Date of ACP Conversation: 11/06/19  Persons included in Conversation:  patient  Length of ACP Conversation in minutes:  <16 minutes (Non-Billable)    Authorized Decision Maker (if patient is incapable of making informed decisions): This person is:    Other Legally Authorized Decision Maker (e.g. Next of Kin)  Daughter-Aviva          For Patients with Decision Making Capacity:   Values/Goals: Exploration of values, goals, and preferences if recovery is not expected, even with continued medical treatment in the event of:  Imminent death  Severe, permanent brain injury    Conversation Outcomes / Follow-Up Plan:   Recommended completion of Advance Directive form after review of ACP materials and conversation with prospective healthcare agent

## 2019-11-06 NOTE — PATIENT INSTRUCTIONS
Medicare Wellness Visit, Female The best way to live healthy is to have a lifestyle where you eat a well-balanced diet, exercise regularly, limit alcohol use, and quit all forms of tobacco/nicotine, if applicable. Regular preventive services are another way to keep healthy. Preventive services (vaccines, screening tests, monitoring & exams) can help personalize your care plan, which helps you manage your own care. Screening tests can find health problems at the earliest stages, when they are easiest to treat. [facility] follows the current, evidence-based guidelines published by the [facility] Arias ([facility]) when recommending preventive services for our patients. Because we follow these guidelines, sometimes recommendations change over time as research supports it. (For example, mammograms used to be recommended annually. Even though Medicare will still pay for an annual mammogram, the newer guidelines recommend a mammogram every two years for women of average risk). Of course, you and your doctor may decide to screen more often for some diseases, based on your risk and your co-morbidities (chronic disease you are already diagnosed with). Preventive services for you include: - Medicare offers their members a free annual wellness visit, which is time for you and your primary care provider to discuss and plan for your preventive service needs. Take advantage of this benefit every year! 
-All adults over the age of 72 should receive the recommended pneumonia vaccines. Current USPSTF guidelines recommend a series of two vaccines for the best pneumonia protection.  
-All adults should have a flu vaccine yearly and a tetanus vaccine every 10 years.  
-All adults age 48 and older should receive the shingles vaccines (series of two vaccines). -All adults age 38-68 who are overweight should have a diabetes screening test once every three years. -All adults born between [birth year] and 1967 should be screened once for Hepatitis C. 
-Other screening tests and preventive services for persons with diabetes include: an eye exam to screen for diabetic retinopathy, a kidney function test, a foot exam, and stricter control over your cholesterol.  
-Cardiovascular screening for adults with routine risk involves an electrocardiogram (ECG) at intervals determined by your doctor.  
-Colorectal cancer screenings should be done for adults age 54-65 with no increased risk factors for colorectal cancer. There are a number of acceptable methods of screening for this type of cancer. Each test has its own benefits and drawbacks. Discuss with your doctor what is most appropriate for you during your annual wellness visit. The different tests include: colonoscopy (considered the best screening method), a fecal occult blood test, a fecal DNA test, and sigmoidoscopy. 
 
-A bone mass density test is recommended when a woman turns 65 to screen for osteoporosis. This test is only recommended one time, as a screening. Some providers will use this same test as a disease monitoring tool if you already have osteoporosis. -Breast cancer screenings are recommended every other year for women of normal risk, age 54-69. 
-Cervical cancer screenings for women over age 72 are only recommended with certain risk factors. Here is a list of your current Health Maintenance items (your personalized list of preventive services) with a due date: 
Health Maintenance Due Topic Date Due  
 Annual Well Visit  10/12/2019  Glaucoma Screening   12/14/2019  Colonoscopy  02/27/2020

## 2019-11-06 NOTE — PROGRESS NOTES
Lashon Jackson  Identified pt with two pt identifiers(name and ). Chief Complaint   Patient presents with    Annual Wellness Visit    Medication Refill    Cold Symptoms     cough, congestion, sore throat, and had a fever of 100 on one day-feels fine today- believes she had walking pneumonia       1. Have you been to the ER, urgent care clinic since your last visit? Hospitalized since your last visit? No    2. Have you seen or consulted any other health care providers outside of the [address] [place] [address] since your last visit? Include any pap smears or colon screening. No      Would you like to sign up for MyChart today, if you have not already done so? Patient has a mychart  If not, would you like information on MyChart, and how to sign up at a later time? No      Medication reconciliation up to date and corrected with patient at this time. Today's provider has been notified of reason for visit, vitals and flowsheets obtained on patients. Reviewed record in preparation for visit, huddled with provider and have obtained necessary documentation.       Health Maintenance Due   Topic    MEDICARE YEARLY EXAM     GLAUCOMA SCREENING Q2Y     BREAST CANCER SCRN MAMMOGRAM     COLONOSCOPY        Wt Readings from Last 3 Encounters:   19 162 lb 11.2 oz (73.8 kg)   10/11/18 180 lb 4.8 oz (81.8 kg)   10/01/18 181 lb 11.2 oz (82.4 kg)     Temp Readings from Last 3 Encounters:   19 97.3 °F (36.3 °C) (Oral)   10/11/18 97.9 °F (36.6 °C) (Oral)   10/01/18 96.3 °F (35.7 °C) (Oral)     BP Readings from Last 3 Encounters:   19 167/85   10/11/18 135/88   10/01/18 128/62     Pulse Readings from Last 3 Encounters:   19 68   10/11/18 92   10/01/18 83     Vitals:    19 1202   BP: 167/85   Pulse: 68   Resp: 20   Temp: 97.3 °F (36.3 °C)   TempSrc: Oral   SpO2: 98%   Weight: 162 lb 11.2 oz (73.8 kg)   Height: 5' 5\" (1.651 m)   PainSc:   0 - No pain         Learning Assessment:  : Learning Assessment 10/11/2018 10/1/2018 7/1/2014   PRIMARY LEARNER Patient Patient Patient   HIGHEST LEVEL OF EDUCATION - PRIMARY LEARNER  2 YEARS OF COLLEGE 2 YEARS OF COLLEGE 2 YEARS OF COLLEGE   BARRIERS PRIMARY LEARNER - - VISUAL   CO-LEARNER CAREGIVER - - No   PRIMARY LANGUAGE ENGLISH ENGLISH ENGLISH   LEARNER PREFERENCE PRIMARY DEMONSTRATION DEMONSTRATION DEMONSTRATION   ANSWERED BY patient Patient patient   RELATIONSHIP SELF SELF SELF       Depression Screening:  :     3 most recent PHQ Screens 11/6/2019   Little interest or pleasure in doing things Not at all   Feeling down, depressed, irritable, or hopeless Not at all   Total Score PHQ 2 0       Fall Risk Assessment:  :     Fall Risk Assessment, last 12 mths 11/6/2019   Able to walk? Yes   Fall in past 12 months? No   Fall with injury? -   Number of falls in past 12 months -   Fall Risk Score -       Abuse Screening:  :     Abuse Screening Questionnaire 11/6/2019 10/11/2018 10/1/2018   Do you ever feel afraid of your partner? N N N   Are you in a relationship with someone who physically or mentally threatens you? N N N   Is it safe for you to go home?  Y Y Y       ADL Screening:  :     ADL Assessment 11/6/2019   Feeding yourself No Help Needed   Getting from bed to chair No Help Needed   Getting dressed No Help Needed   Bathing or showering No Help Needed   Walk across the room (includes cane/walker) No Help Needed   Using the telphone No Help Needed   Taking your medications No Help Needed   Preparing meals No Help Needed   Managing money (expenses/bills) No Help Needed   Moderately strenuous housework (laundry) No Help Needed   Shopping for personal items (toiletries/medicines) No Help Needed   Shopping for groceries No Help Needed   Driving No Help Needed   Climbing a flight of stairs No Help Needed   Getting to places beyond walking distances No Help Needed
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
## 2019-11-07 LAB
25(OH)D3+25(OH)D2 SERPL-MCNC: 49.4 NG/ML (ref 30–100)
ALBUMIN SERPL-MCNC: 4.3 G/DL (ref 3.5–4.8)
ALBUMIN/GLOB SERPL: 1.7 {RATIO} (ref 1.2–2.2)
ALP SERPL-CCNC: 68 IU/L (ref 39–117)
ALT SERPL-CCNC: 13 IU/L (ref 0–32)
APPEARANCE UR: CLEAR
AST SERPL-CCNC: 23 IU/L (ref 0–40)
BACTERIA #/AREA URNS HPF: ABNORMAL /[HPF]
BILIRUB SERPL-MCNC: 0.8 MG/DL (ref 0–1.2)
BILIRUB UR QL STRIP: NEGATIVE
BUN SERPL-MCNC: 16 MG/DL (ref 8–27)
BUN/CREAT SERPL: 23 (ref 12–28)
CALCIUM SERPL-MCNC: 9.1 MG/DL (ref 8.7–10.3)
CASTS URNS QL MICRO: ABNORMAL /LPF
CHLORIDE SERPL-SCNC: 107 MMOL/L (ref 96–106)
CHOLEST SERPL-MCNC: 237 MG/DL (ref 100–199)
CO2 SERPL-SCNC: 16 MMOL/L (ref 20–29)
COLOR UR: YELLOW
CREAT SERPL-MCNC: 0.69 MG/DL (ref 0.57–1)
EPI CELLS #/AREA URNS HPF: ABNORMAL /HPF (ref 0–10)
EST. AVERAGE GLUCOSE BLD GHB EST-MCNC: 108 MG/DL
GLOBULIN SER CALC-MCNC: 2.5 G/DL (ref 1.5–4.5)
GLUCOSE SERPL-MCNC: 88 MG/DL (ref 65–99)
GLUCOSE UR QL: NEGATIVE
HBA1C MFR BLD: 5.4 % (ref 4.8–5.6)
HDLC SERPL-MCNC: 102 MG/DL
HGB UR QL STRIP: NEGATIVE
INTERPRETATION, 910389: NORMAL
KETONES UR QL STRIP: ABNORMAL
LDLC SERPL CALC-MCNC: 122 MG/DL (ref 0–99)
LEUKOCYTE ESTERASE UR QL STRIP: ABNORMAL
MICRO URNS: ABNORMAL
MUCOUS THREADS URNS QL MICRO: PRESENT
NITRITE UR QL STRIP: NEGATIVE
PH UR STRIP: 5 [PH] (ref 5–7.5)
POTASSIUM SERPL-SCNC: 4.5 MMOL/L (ref 3.5–5.2)
PROT SERPL-MCNC: 6.8 G/DL (ref 6–8.5)
PROT UR QL STRIP: NEGATIVE
RBC #/AREA URNS HPF: ABNORMAL /HPF (ref 0–2)
SODIUM SERPL-SCNC: 146 MMOL/L (ref 134–144)
SP GR UR: 1.03 (ref 1–1.03)
T4 FREE SERPL-MCNC: 1.25 NG/DL (ref 0.82–1.77)
TRIGL SERPL-MCNC: 65 MG/DL (ref 0–149)
TSH SERPL DL<=0.005 MIU/L-ACNC: 1.8 UIU/ML (ref 0.45–4.5)
UROBILINOGEN UR STRIP-MCNC: 0.2 MG/DL (ref 0.2–1)
VLDLC SERPL CALC-MCNC: 13 MG/DL (ref 5–40)
WBC #/AREA URNS HPF: ABNORMAL /HPF (ref 0–5)

## 2019-11-29 DIAGNOSIS — F40.243 FEAR OF FLYING: ICD-10-CM

## 2019-12-02 RX ORDER — BUSPIRONE HYDROCHLORIDE 10 MG/1
TABLET ORAL
Qty: 30 TAB | Refills: 0 | OUTPATIENT
Start: 2019-12-02

## 2020-10-20 ENCOUNTER — TELEPHONE (OUTPATIENT)
Dept: FAMILY MEDICINE CLINIC | Age: 70
End: 2020-10-20

## 2020-10-20 DIAGNOSIS — F40.243 FEAR OF FLYING: ICD-10-CM

## 2020-10-20 NOTE — TELEPHONE ENCOUNTER
PCP: Costa Keane MD    Last appt: Visit date not found  No future appointments.     Requested Prescriptions     Pending Prescriptions Disp Refills    busPIRone (BUSPAR) 10 mg tablet 30 Tab 0     Sig: TAKE 0.5-2 TABLETS BY MOUTH PRIOR TO TRAVEL AS NEEDED       Pharmacy verified: YES     Prior labs and Blood pressures:  BP Readings from Last 3 Encounters:   11/06/19 140/80   10/11/18 135/88   10/01/18 128/62     Lab Results   Component Value Date/Time    Sodium 146 (H) 11/06/2019 12:55 PM    Potassium 4.5 11/06/2019 12:55 PM    Chloride 107 (H) 11/06/2019 12:55 PM    CO2 16 (L) 11/06/2019 12:55 PM    Anion gap 12 09/22/2010 09:41 AM    Glucose 88 11/06/2019 12:55 PM    BUN 16 11/06/2019 12:55 PM    Creatinine 0.69 11/06/2019 12:55 PM    BUN/Creatinine ratio 23 11/06/2019 12:55 PM    GFR est  11/06/2019 12:55 PM    GFR est non-AA 88 11/06/2019 12:55 PM    Calcium 9.1 11/06/2019 12:55 PM     Lab Results   Component Value Date/Time    Hemoglobin A1c 5.4 11/06/2019 12:55 PM     Lab Results   Component Value Date/Time    Cholesterol, total 237 (H) 11/06/2019 12:55 PM    HDL Cholesterol 102 11/06/2019 12:55 PM    LDL, calculated 122 (H) 11/06/2019 12:55 PM    VLDL, calculated 13 11/06/2019 12:55 PM    Triglyceride 65 11/06/2019 12:55 PM    CHOL/HDL Ratio 2.7 09/22/2010 09:41 AM     Lab Results   Component Value Date/Time    Vitamin D 25-Hydroxy 33.6 12/28/2010 08:15 AM    VITAMIN D, 25-HYDROXY 49.4 11/06/2019 12:55 PM       Lab Results   Component Value Date/Time    TSH 1.800 11/06/2019 12:55 PM

## 2020-10-20 NOTE — TELEPHONE ENCOUNTER
Patient requesting to have labs sent to [facility] through patient schedule. Made patient aware that I didn't see any orders from Dr. Katy Espinoza but will send a message to be sent to the on call provider.      P: [phone]

## 2020-10-21 DIAGNOSIS — E78.00 PURE HYPERCHOLESTEROLEMIA: ICD-10-CM

## 2020-10-27 RX ORDER — BUSPIRONE HYDROCHLORIDE 10 MG/1
TABLET ORAL
Qty: 30 TAB | Refills: 0 | Status: SHIPPED | OUTPATIENT
Start: 2020-10-27 | End: 2021-11-19

## 2020-10-27 RX ORDER — PRAVASTATIN SODIUM 20 MG/1
TABLET ORAL
Qty: 90 TAB | Refills: 0 | Status: SHIPPED | OUTPATIENT
Start: 2020-10-27 | End: 2021-01-17

## 2020-11-04 ENCOUNTER — PATIENT MESSAGE (OUTPATIENT)
Dept: FAMILY MEDICINE CLINIC | Age: 70
End: 2020-11-04

## 2020-12-02 DIAGNOSIS — R73.03 PREDIABETES: ICD-10-CM

## 2020-12-02 DIAGNOSIS — R82.81 PYURIA: ICD-10-CM

## 2020-12-02 DIAGNOSIS — E78.00 PURE HYPERCHOLESTEROLEMIA: ICD-10-CM

## 2020-12-02 DIAGNOSIS — N39.3 URINARY, INCONTINENCE, STRESS FEMALE: ICD-10-CM

## 2020-12-02 DIAGNOSIS — E55.9 VITAMIN D DEFICIENCY: ICD-10-CM

## 2020-12-02 DIAGNOSIS — R73.09 INCREASED GLUCOSE LEVEL: ICD-10-CM

## 2020-12-02 DIAGNOSIS — E03.9 HYPOTHYROIDISM, UNSPECIFIED TYPE: Primary | ICD-10-CM

## 2020-12-08 LAB
25(OH)D3+25(OH)D2 SERPL-MCNC: 27.3 NG/ML (ref 30–100)
ALBUMIN SERPL-MCNC: 4.6 G/DL (ref 3.7–4.7)
ALBUMIN/GLOB SERPL: 2.2 {RATIO} (ref 1.2–2.2)
ALP SERPL-CCNC: 68 IU/L (ref 39–117)
ALT SERPL-CCNC: 15 IU/L (ref 0–32)
APPEARANCE UR: CLEAR
AST SERPL-CCNC: 23 IU/L (ref 0–40)
BILIRUB SERPL-MCNC: 1.2 MG/DL (ref 0–1.2)
BILIRUB UR QL STRIP: NEGATIVE
BUN SERPL-MCNC: 12 MG/DL (ref 8–27)
BUN/CREAT SERPL: 17 (ref 12–28)
CALCIUM SERPL-MCNC: 9.8 MG/DL (ref 8.7–10.3)
CHLORIDE SERPL-SCNC: 102 MMOL/L (ref 96–106)
CHOLEST SERPL-MCNC: 253 MG/DL (ref 100–199)
CO2 SERPL-SCNC: 25 MMOL/L (ref 20–29)
COLOR UR: YELLOW
CREAT SERPL-MCNC: 0.72 MG/DL (ref 0.57–1)
EST. AVERAGE GLUCOSE BLD GHB EST-MCNC: 105 MG/DL
GLOBULIN SER CALC-MCNC: 2.1 G/DL (ref 1.5–4.5)
GLUCOSE SERPL-MCNC: 105 MG/DL (ref 65–99)
GLUCOSE UR QL: NEGATIVE
HBA1C MFR BLD: 5.3 % (ref 4.8–5.6)
HDLC SERPL-MCNC: 117 MG/DL
HGB UR QL STRIP: NEGATIVE
INTERPRETATION, 910389: NORMAL
KETONES UR QL STRIP: NEGATIVE
LDLC SERPL CALC-MCNC: 118 MG/DL (ref 0–99)
LEUKOCYTE ESTERASE UR QL STRIP: NEGATIVE
MICRO URNS: NORMAL
NITRITE UR QL STRIP: NEGATIVE
PH UR STRIP: 6 [PH] (ref 5–7.5)
POTASSIUM SERPL-SCNC: 4.5 MMOL/L (ref 3.5–5.2)
PROT SERPL-MCNC: 6.7 G/DL (ref 6–8.5)
PROT UR QL STRIP: NEGATIVE
SODIUM SERPL-SCNC: 141 MMOL/L (ref 134–144)
SP GR UR: 1.01 (ref 1–1.03)
T4 FREE SERPL-MCNC: 1.55 NG/DL (ref 0.82–1.77)
TRIGL SERPL-MCNC: 108 MG/DL (ref 0–149)
TSH SERPL DL<=0.005 MIU/L-ACNC: 2.61 UIU/ML (ref 0.45–4.5)
UROBILINOGEN UR STRIP-MCNC: 0.2 MG/DL (ref 0.2–1)
VLDLC SERPL CALC-MCNC: 18 MG/DL (ref 5–40)

## 2020-12-08 NOTE — PROGRESS NOTES
Mild inc BS. Average BS nml. Lipids up. Prev heart scan with 0 score 2016. Rec 5 yr repeat. Low Vit. D. Take OTC supp 5400-2056 units daily.   Rest all O.K.

## 2020-12-10 LAB — SARS-COV-2, NAA: NOT DETECTED

## 2021-01-17 DIAGNOSIS — E78.00 PURE HYPERCHOLESTEROLEMIA: ICD-10-CM

## 2021-01-17 RX ORDER — PRAVASTATIN SODIUM 20 MG/1
TABLET ORAL
Qty: 90 TAB | Refills: 0 | Status: SHIPPED | OUTPATIENT
Start: 2021-01-17 | End: 2021-04-15 | Stop reason: SDUPTHER

## 2021-02-05 ENCOUNTER — OFFICE VISIT (OUTPATIENT)
Dept: FAMILY MEDICINE CLINIC | Age: 71
End: 2021-02-05
Payer: MEDICARE

## 2021-02-05 VITALS
BODY MASS INDEX: 27.92 KG/M2 | OXYGEN SATURATION: 99 % | HEIGHT: 65 IN | RESPIRATION RATE: 18 BRPM | DIASTOLIC BLOOD PRESSURE: 81 MMHG | TEMPERATURE: 96.9 F | SYSTOLIC BLOOD PRESSURE: 148 MMHG | HEART RATE: 77 BPM | WEIGHT: 167.6 LBS

## 2021-02-05 DIAGNOSIS — Z13.39 SCREENING FOR ALCOHOLISM: ICD-10-CM

## 2021-02-05 DIAGNOSIS — Z12.31 ENCOUNTER FOR SCREENING MAMMOGRAM FOR MALIGNANT NEOPLASM OF BREAST: ICD-10-CM

## 2021-02-05 DIAGNOSIS — Z13.5 GLAUCOMA SCREENING: ICD-10-CM

## 2021-02-05 DIAGNOSIS — Z00.00 MEDICARE ANNUAL WELLNESS VISIT, SUBSEQUENT: Primary | ICD-10-CM

## 2021-02-05 DIAGNOSIS — Z12.11 SCREENING FOR COLORECTAL CANCER: ICD-10-CM

## 2021-02-05 DIAGNOSIS — Z12.12 SCREENING FOR COLORECTAL CANCER: ICD-10-CM

## 2021-02-05 PROCEDURE — G9899 SCRN MAM PERF RSLTS DOC: HCPCS | Performed by: STUDENT IN AN ORGANIZED HEALTH CARE EDUCATION/TRAINING PROGRAM

## 2021-02-05 PROCEDURE — G8432 DEP SCR NOT DOC, RNG: HCPCS | Performed by: STUDENT IN AN ORGANIZED HEALTH CARE EDUCATION/TRAINING PROGRAM

## 2021-02-05 PROCEDURE — G8427 DOCREV CUR MEDS BY ELIG CLIN: HCPCS | Performed by: STUDENT IN AN ORGANIZED HEALTH CARE EDUCATION/TRAINING PROGRAM

## 2021-02-05 PROCEDURE — G0439 PPPS, SUBSEQ VISIT: HCPCS | Performed by: STUDENT IN AN ORGANIZED HEALTH CARE EDUCATION/TRAINING PROGRAM

## 2021-02-05 PROCEDURE — G8536 NO DOC ELDER MAL SCRN: HCPCS | Performed by: STUDENT IN AN ORGANIZED HEALTH CARE EDUCATION/TRAINING PROGRAM

## 2021-02-05 PROCEDURE — 3017F COLORECTAL CA SCREEN DOC REV: CPT | Performed by: STUDENT IN AN ORGANIZED HEALTH CARE EDUCATION/TRAINING PROGRAM

## 2021-02-05 PROCEDURE — 1101F PT FALLS ASSESS-DOCD LE1/YR: CPT | Performed by: STUDENT IN AN ORGANIZED HEALTH CARE EDUCATION/TRAINING PROGRAM

## 2021-02-05 PROCEDURE — G8419 CALC BMI OUT NRM PARAM NOF/U: HCPCS | Performed by: STUDENT IN AN ORGANIZED HEALTH CARE EDUCATION/TRAINING PROGRAM

## 2021-02-05 PROCEDURE — G8399 PT W/DXA RESULTS DOCUMENT: HCPCS | Performed by: STUDENT IN AN ORGANIZED HEALTH CARE EDUCATION/TRAINING PROGRAM

## 2021-02-05 RX ORDER — FISH OIL/DHA/EPA 1200-144MG
CAPSULE ORAL
COMMUNITY

## 2021-02-05 NOTE — PATIENT INSTRUCTIONS
Medicare Wellness Visit, Female The best way to live healthy is to have a lifestyle where you eat a well-balanced diet, exercise regularly, limit alcohol use, and quit all forms of tobacco/nicotine, if applicable. Regular preventive services are another way to keep healthy. Preventive services (vaccines, screening tests, monitoring & exams) can help personalize your care plan, which helps you manage your own care. Screening tests can find health problems at the earliest stages, when they are easiest to treat. [facility] follows the current, evidence-based guidelines published by the [facility] Arias ([facility]) when recommending preventive services for our patients. Because we follow these guidelines, sometimes recommendations change over time as research supports it. (For example, mammograms used to be recommended annually. Even though Medicare will still pay for an annual mammogram, the newer guidelines recommend a mammogram every two years for women of average risk). Of course, you and your doctor may decide to screen more often for some diseases, based on your risk and your co-morbidities (chronic disease you are already diagnosed with). Preventive services for you include: - Medicare offers their members a free annual wellness visit, which is time for you and your primary care provider to discuss and plan for your preventive service needs. Take advantage of this benefit every year! 
-All adults over the age of 72 should receive the recommended pneumonia vaccines. Current USPSTF guidelines recommend a series of two vaccines for the best pneumonia protection.  
-All adults should have a flu vaccine yearly and a tetanus vaccine every 10 years.  
-All adults age 48 and older should receive the shingles vaccines (series of two vaccines). -All adults age 38-68 who are overweight should have a diabetes screening test once every three years. -All adults born between [birth year] and 1965 should be screened once for Hepatitis C. 
-Other screening tests and preventive services for persons with diabetes include: an eye exam to screen for diabetic retinopathy, a kidney function test, a foot exam, and stricter control over your cholesterol.  
-Cardiovascular screening for adults with routine risk involves an electrocardiogram (ECG) at intervals determined by your doctor.  
-Colorectal cancer screenings should be done for adults age 54-65 with no increased risk factors for colorectal cancer. There are a number of acceptable methods of screening for this type of cancer. Each test has its own benefits and drawbacks. Discuss with your doctor what is most appropriate for you during your annual wellness visit. The different tests include: colonoscopy (considered the best screening method), a fecal occult blood test, a fecal DNA test, and sigmoidoscopy. 
 
-A bone mass density test is recommended when a woman turns 65 to screen for osteoporosis. This test is only recommended one time, as a screening. Some providers will use this same test as a disease monitoring tool if you already have osteoporosis. -Breast cancer screenings are recommended every other year for women of normal risk, age 54-69. 
-Cervical cancer screenings for women over age 72 are only recommended with certain risk factors. Here is a list of your current Health Maintenance items (your personalized list of preventive services) with a due date: 
Health Maintenance Due Topic Date Due  Glaucoma Screening   12/14/2019  Colorectal Screening  02/27/2020  Yearly Flu Vaccine (1) 09/01/2020
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
## 2021-02-05 NOTE — PROGRESS NOTES
Name and  Verified. Former Patient of Dr. Dylan Sharp ([facility])    Chief Complaint   Patient presents with   [facility] Care       1. Have you been to the ER, urgent care clinic since your last visit? Hospitalized since your last visit? Yes  [facility]  Sore Throat  1/15/2021  Treated with Z -Pack  COVID-19 NEGATIVE    2. Have you seen or consulted any other health care providers outside of the [address] [place] [address] since your last visit? Include any pap smears or colon screening.    No

## 2021-02-19 LAB — HEMOCCULT STL QL IA: NEGATIVE

## 2021-03-22 DIAGNOSIS — E03.9 HYPOTHYROIDISM, UNSPECIFIED TYPE: ICD-10-CM

## 2021-03-22 NOTE — TELEPHONE ENCOUNTER
Last visit 02/05/2021 MD Lainez   Next appointment 1 year (02/2022)   Previous refill encounter(s)   12/27/2020 Synthroid #90    Requested Prescriptions     Pending Prescriptions Disp Refills    levothyroxine (SYNTHROID) 75 mcg tablet 90 Tab 0     Sig: Take 1 Tab by mouth Daily (before breakfast).

## 2021-03-26 RX ORDER — LEVOTHYROXINE SODIUM 75 UG/1
75 TABLET ORAL
Qty: 90 TAB | Refills: 0 | Status: SHIPPED | OUTPATIENT
Start: 2021-03-26 | End: 2021-03-26 | Stop reason: SDUPTHER

## 2021-04-15 DIAGNOSIS — E78.00 PURE HYPERCHOLESTEROLEMIA: ICD-10-CM

## 2021-04-15 RX ORDER — PRAVASTATIN SODIUM 20 MG/1
TABLET ORAL
Qty: 90 TAB | Refills: 3 | Status: SHIPPED | OUTPATIENT
Start: 2021-04-15 | End: 2021-11-19 | Stop reason: SDUPTHER

## 2021-04-20 ENCOUNTER — PATIENT MESSAGE (OUTPATIENT)
Dept: FAMILY MEDICINE CLINIC | Age: 71
End: 2021-04-20

## 2021-04-20 DIAGNOSIS — S99.912S INJURY OF LEFT ANKLE, SEQUELA: Primary | ICD-10-CM

## 2021-05-10 ENCOUNTER — HOSPITAL ENCOUNTER (OUTPATIENT)
Dept: MAMMOGRAPHY | Age: 71
Discharge: HOME | End: 2021-05-10
Attending: STUDENT IN AN ORGANIZED HEALTH CARE EDUCATION/TRAINING PROGRAM
Payer: MEDICARE

## 2021-05-10 DIAGNOSIS — Z12.31 ENCOUNTER FOR SCREENING MAMMOGRAM FOR MALIGNANT NEOPLASM OF BREAST: ICD-10-CM

## 2021-05-10 PROCEDURE — 77067 SCR MAMMO BI INCL CAD: CPT

## 2021-09-20 ENCOUNTER — OFFICE VISIT (OUTPATIENT)
Dept: FAMILY MEDICINE CLINIC | Age: 71
End: 2021-09-20
Payer: MEDICARE

## 2021-09-20 VITALS
WEIGHT: 170.4 LBS | HEIGHT: 65 IN | RESPIRATION RATE: 16 BRPM | OXYGEN SATURATION: 97 % | SYSTOLIC BLOOD PRESSURE: 160 MMHG | BODY MASS INDEX: 28.39 KG/M2 | HEART RATE: 69 BPM | DIASTOLIC BLOOD PRESSURE: 82 MMHG | TEMPERATURE: 97.6 F

## 2021-09-20 DIAGNOSIS — Z87.891 PERSONAL HISTORY OF TOBACCO USE, PRESENTING HAZARDS TO HEALTH: ICD-10-CM

## 2021-09-20 DIAGNOSIS — I10 ESSENTIAL HYPERTENSION: ICD-10-CM

## 2021-09-20 DIAGNOSIS — F41.9 ANXIETY: ICD-10-CM

## 2021-09-20 DIAGNOSIS — E03.9 ACQUIRED HYPOTHYROIDISM: Primary | ICD-10-CM

## 2021-09-20 PROCEDURE — G0296 VISIT TO DETERM LDCT ELIG: HCPCS | Performed by: STUDENT IN AN ORGANIZED HEALTH CARE EDUCATION/TRAINING PROGRAM

## 2021-09-20 PROCEDURE — 99214 OFFICE O/P EST MOD 30 MIN: CPT | Performed by: STUDENT IN AN ORGANIZED HEALTH CARE EDUCATION/TRAINING PROGRAM

## 2021-09-20 RX ORDER — LISINOPRIL 20 MG/1
20 TABLET ORAL DAILY
Qty: 90 TABLET | Refills: 0 | Status: SHIPPED | OUTPATIENT
Start: 2021-09-20 | End: 2021-11-19 | Stop reason: SDUPTHER

## 2021-09-20 RX ORDER — DIAZEPAM 2 MG/1
TABLET ORAL
Qty: 10 TABLET | Refills: 0 | Status: SHIPPED | OUTPATIENT
Start: 2021-09-20 | End: 2021-11-19 | Stop reason: SDUPTHER

## 2021-09-20 RX ORDER — ASPIRIN 81 MG/1
TABLET ORAL DAILY
COMMUNITY
End: 2022-04-04
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
## 2021-09-21 LAB
ANION GAP SERPL CALC-SCNC: 5 MMOL/L (ref 5–15)
BUN SERPL-MCNC: 20 MG/DL (ref 6–20)
BUN/CREAT SERPL: 29 (ref 12–20)
CALCIUM SERPL-MCNC: 9.1 MG/DL (ref 8.5–10.1)
CHLORIDE SERPL-SCNC: 110 MMOL/L (ref 97–108)
CO2 SERPL-SCNC: 26 MMOL/L (ref 21–32)
CREAT SERPL-MCNC: 0.69 MG/DL (ref 0.55–1.02)
GLUCOSE SERPL-MCNC: 96 MG/DL (ref 65–100)
POTASSIUM SERPL-SCNC: 4.6 MMOL/L (ref 3.5–5.1)
SODIUM SERPL-SCNC: 141 MMOL/L (ref 136–145)
T4 FREE SERPL-MCNC: 1.1 NG/DL (ref 0.8–1.5)
TSH SERPL DL<=0.05 MIU/L-ACNC: 1.84 UIU/ML (ref 0.36–3.74)

## 2021-09-29 DIAGNOSIS — E03.9 HYPOTHYROIDISM, UNSPECIFIED TYPE: ICD-10-CM

## 2021-09-29 RX ORDER — LEVOTHYROXINE SODIUM 75 UG/1
75 TABLET ORAL
Qty: 90 TABLET | Refills: 0 | Status: SHIPPED | OUTPATIENT
Start: 2021-09-29 | End: 2021-11-19 | Stop reason: SDUPTHER

## 2021-09-29 NOTE — TELEPHONE ENCOUNTER
Refill medication request:  levothyroxine (SYNTHROID) 75 mcg tablet  Sig: Take 1 Tab by mouth Daily (before breakfast).     Last filled:   3/28/2021    Last seen: 9/20/2021

## 2021-09-30 ENCOUNTER — HOSPITAL ENCOUNTER (OUTPATIENT)
Dept: CT IMAGING | Age: 71
Discharge: HOME | End: 2021-09-30
Attending: STUDENT IN AN ORGANIZED HEALTH CARE EDUCATION/TRAINING PROGRAM
Payer: MEDICARE

## 2021-09-30 VITALS — BODY MASS INDEX: 27.66 KG/M2 | WEIGHT: 166 LBS | HEIGHT: 65 IN

## 2021-09-30 DIAGNOSIS — Z87.891 PERSONAL HISTORY OF TOBACCO USE, PRESENTING HAZARDS TO HEALTH: ICD-10-CM

## 2021-09-30 PROCEDURE — 71271 CT THORAX LUNG CANCER SCR C-: CPT

## 2021-10-01 NOTE — PROGRESS NOTES
Reviewed. Nodules with very low likelihood of becoming clinically  significant  Management recommendation: Continue annual screening with low dose CT scan in 12 months.

## 2021-11-19 ENCOUNTER — OFFICE VISIT (OUTPATIENT)
Dept: FAMILY MEDICINE CLINIC | Age: 71
End: 2021-11-19
Payer: MEDICARE

## 2021-11-19 VITALS
RESPIRATION RATE: 16 BRPM | WEIGHT: 166.4 LBS | OXYGEN SATURATION: 99 % | HEART RATE: 66 BPM | DIASTOLIC BLOOD PRESSURE: 84 MMHG | HEIGHT: 65 IN | BODY MASS INDEX: 27.72 KG/M2 | SYSTOLIC BLOOD PRESSURE: 130 MMHG | TEMPERATURE: 97.1 F

## 2021-11-19 DIAGNOSIS — E03.9 HYPOTHYROIDISM, UNSPECIFIED TYPE: Primary | ICD-10-CM

## 2021-11-19 DIAGNOSIS — I10 ESSENTIAL HYPERTENSION: ICD-10-CM

## 2021-11-19 DIAGNOSIS — F41.9 ANXIETY: ICD-10-CM

## 2021-11-19 DIAGNOSIS — E78.00 PURE HYPERCHOLESTEROLEMIA: ICD-10-CM

## 2021-11-19 DIAGNOSIS — Z12.11 COLON CANCER SCREENING: ICD-10-CM

## 2021-11-19 PROCEDURE — 99214 OFFICE O/P EST MOD 30 MIN: CPT | Performed by: STUDENT IN AN ORGANIZED HEALTH CARE EDUCATION/TRAINING PROGRAM

## 2021-11-19 PROCEDURE — G8419 CALC BMI OUT NRM PARAM NOF/U: HCPCS | Performed by: STUDENT IN AN ORGANIZED HEALTH CARE EDUCATION/TRAINING PROGRAM

## 2021-11-19 PROCEDURE — 1090F PRES/ABSN URINE INCON ASSESS: CPT | Performed by: STUDENT IN AN ORGANIZED HEALTH CARE EDUCATION/TRAINING PROGRAM

## 2021-11-19 PROCEDURE — G8427 DOCREV CUR MEDS BY ELIG CLIN: HCPCS | Performed by: STUDENT IN AN ORGANIZED HEALTH CARE EDUCATION/TRAINING PROGRAM

## 2021-11-19 PROCEDURE — G8432 DEP SCR NOT DOC, RNG: HCPCS | Performed by: STUDENT IN AN ORGANIZED HEALTH CARE EDUCATION/TRAINING PROGRAM

## 2021-11-19 PROCEDURE — G9899 SCRN MAM PERF RSLTS DOC: HCPCS | Performed by: STUDENT IN AN ORGANIZED HEALTH CARE EDUCATION/TRAINING PROGRAM

## 2021-11-19 PROCEDURE — 3017F COLORECTAL CA SCREEN DOC REV: CPT | Performed by: STUDENT IN AN ORGANIZED HEALTH CARE EDUCATION/TRAINING PROGRAM

## 2021-11-19 PROCEDURE — 1101F PT FALLS ASSESS-DOCD LE1/YR: CPT | Performed by: STUDENT IN AN ORGANIZED HEALTH CARE EDUCATION/TRAINING PROGRAM

## 2021-11-19 PROCEDURE — G8399 PT W/DXA RESULTS DOCUMENT: HCPCS | Performed by: STUDENT IN AN ORGANIZED HEALTH CARE EDUCATION/TRAINING PROGRAM

## 2021-11-19 PROCEDURE — G8536 NO DOC ELDER MAL SCRN: HCPCS | Performed by: STUDENT IN AN ORGANIZED HEALTH CARE EDUCATION/TRAINING PROGRAM

## 2021-11-19 RX ORDER — PRAVASTATIN SODIUM 20 MG/1
TABLET ORAL
Qty: 90 TABLET | Refills: 3 | Status: SHIPPED | OUTPATIENT
Start: 2021-11-19

## 2021-11-19 RX ORDER — LEVOTHYROXINE SODIUM 75 UG/1
75 TABLET ORAL
Qty: 90 TABLET | Refills: 3 | Status: SHIPPED | OUTPATIENT
Start: 2021-11-19 | End: 2022-04-04 | Stop reason: SDUPTHER

## 2021-11-19 RX ORDER — DIAZEPAM 2 MG/1
TABLET ORAL
Qty: 10 TABLET | Refills: 5 | Status: SHIPPED | OUTPATIENT
Start: 2021-11-19 | End: 2022-08-08 | Stop reason: SDUPTHER

## 2021-11-19 RX ORDER — LISINOPRIL 20 MG/1
20 TABLET ORAL DAILY
Qty: 90 TABLET | Refills: 3 | Status: SHIPPED | OUTPATIENT
Start: 2021-11-19
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
## 2021-11-19 NOTE — PROGRESS NOTES
Name and  Verified. Pharmacy verified    Chief Complaint   Patient presents with    Follow-up     Hypothyroidism       1. Have you been to the ER, urgent care clinic since your last visit? Hospitalized since your last visit? No    2. Have you seen or consulted any other health care providers outside of the [address] [place] [address] since your last visit? Include any pap smears or colon screening.  No      Health Maintenance Due   Topic Date Due    Colorectal Cancer Screening Combo  2021

## 2021-12-28 ENCOUNTER — DOCUMENTATION ONLY (OUTPATIENT)
Dept: FAMILY MEDICINE CLINIC | Age: 71
End: 2021-12-28

## 2021-12-28 DIAGNOSIS — R19.5 POSITIVE COLORECTAL CANCER SCREENING USING COLOGUARD TEST: Primary | ICD-10-CM

## 2021-12-28 NOTE — PROGRESS NOTES
Reviewed Notes from [employer] (12/10/2021)    Per report,  - COLOGUARD - POSITIVE, Needs COLONOSCOPY. Will call to inform patient    Full report will be scanned into chart.     Signed By: Yazan De La Rosa MD     December 28, 2021

## 2021-12-28 NOTE — PROGRESS NOTES
I called and was able to talk directly with patient. The patient was identified by 2 identifiers. Discussed Cologuard results and will be referring her to GI for colonoscopy. Pt appreciates call and states that she will call to set it up.       Signed By: Vipul Stock MD     December 28, 2021

## 2022-03-19 PROBLEM — J38.3 SPASTIC DYSPHONIA: Status: ACTIVE | Noted: 2019-11-06

## 2022-03-19 PROBLEM — F40.243 FEAR OF FLYING: Status: ACTIVE | Noted: 2018-10-11

## 2022-03-19 PROBLEM — R82.81 PYURIA: Status: ACTIVE | Noted: 2019-11-06

## 2022-03-19 PROBLEM — R73.09 INCREASED GLUCOSE LEVEL: Status: ACTIVE | Noted: 2019-11-06

## 2022-03-19 PROBLEM — R73.03 PREDIABETES: Status: ACTIVE | Noted: 2017-06-06

## 2022-03-28 LAB — SARS-COV-2, NAA: NEGATIVE

## 2022-04-04 ENCOUNTER — OFFICE VISIT (OUTPATIENT)
Dept: FAMILY MEDICINE CLINIC | Age: 72
End: 2022-04-04
Payer: MEDICARE

## 2022-04-04 VITALS
DIASTOLIC BLOOD PRESSURE: 82 MMHG | WEIGHT: 164 LBS | SYSTOLIC BLOOD PRESSURE: 142 MMHG | HEART RATE: 76 BPM | HEIGHT: 65 IN | OXYGEN SATURATION: 97 % | RESPIRATION RATE: 16 BRPM | BODY MASS INDEX: 27.32 KG/M2 | TEMPERATURE: 97.5 F

## 2022-04-04 DIAGNOSIS — E03.9 ACQUIRED HYPOTHYROIDISM: ICD-10-CM

## 2022-04-04 DIAGNOSIS — Z00.00 MEDICARE ANNUAL WELLNESS VISIT, SUBSEQUENT: Primary | ICD-10-CM

## 2022-04-04 DIAGNOSIS — E78.2 MIXED HYPERLIPIDEMIA: ICD-10-CM

## 2022-04-04 DIAGNOSIS — R73.02 IMPAIRED GLUCOSE TOLERANCE: ICD-10-CM

## 2022-04-04 DIAGNOSIS — Z79.899 MEDICATION MANAGEMENT: ICD-10-CM

## 2022-04-04 DIAGNOSIS — R53.83 OTHER FATIGUE: ICD-10-CM

## 2022-04-04 DIAGNOSIS — I10 PRIMARY HYPERTENSION: ICD-10-CM

## 2022-04-04 LAB
ANION GAP SERPL CALC-SCNC: 4 MMOL/L (ref 5–15)
BUN SERPL-MCNC: 17 MG/DL (ref 6–20)
BUN/CREAT SERPL: 25 (ref 12–20)
CALCIUM SERPL-MCNC: 9.3 MG/DL (ref 8.5–10.1)
CHLORIDE SERPL-SCNC: 107 MMOL/L (ref 97–108)
CHOLEST SERPL-MCNC: 237 MG/DL
CO2 SERPL-SCNC: 26 MMOL/L (ref 21–32)
CREAT SERPL-MCNC: 0.68 MG/DL (ref 0.55–1.02)
EST. AVERAGE GLUCOSE BLD GHB EST-MCNC: 111 MG/DL
GLUCOSE SERPL-MCNC: 110 MG/DL (ref 65–100)
HBA1C MFR BLD: 5.5 % (ref 4–5.6)
HDLC SERPL-MCNC: 102 MG/DL
HDLC SERPL: 2.3 {RATIO} (ref 0–5)
LDLC SERPL CALC-MCNC: 124.6 MG/DL (ref 0–100)
POTASSIUM SERPL-SCNC: 4.6 MMOL/L (ref 3.5–5.1)
SODIUM SERPL-SCNC: 137 MMOL/L (ref 136–145)
T4 FREE SERPL-MCNC: 1.5 NG/DL (ref 0.8–1.5)
TRIGL SERPL-MCNC: 52 MG/DL (ref ?–150)
TSH SERPL DL<=0.05 MIU/L-ACNC: 0.14 UIU/ML (ref 0.36–3.74)
VIT B12 SERPL-MCNC: 1194 PG/ML (ref 193–986)
VLDLC SERPL CALC-MCNC: 10.4 MG/DL

## 2022-04-04 PROCEDURE — 1101F PT FALLS ASSESS-DOCD LE1/YR: CPT | Performed by: STUDENT IN AN ORGANIZED HEALTH CARE EDUCATION/TRAINING PROGRAM

## 2022-04-04 PROCEDURE — 1090F PRES/ABSN URINE INCON ASSESS: CPT | Performed by: STUDENT IN AN ORGANIZED HEALTH CARE EDUCATION/TRAINING PROGRAM

## 2022-04-04 PROCEDURE — G8399 PT W/DXA RESULTS DOCUMENT: HCPCS | Performed by: STUDENT IN AN ORGANIZED HEALTH CARE EDUCATION/TRAINING PROGRAM

## 2022-04-04 PROCEDURE — G8510 SCR DEP NEG, NO PLAN REQD: HCPCS | Performed by: STUDENT IN AN ORGANIZED HEALTH CARE EDUCATION/TRAINING PROGRAM

## 2022-04-04 PROCEDURE — G8419 CALC BMI OUT NRM PARAM NOF/U: HCPCS | Performed by: STUDENT IN AN ORGANIZED HEALTH CARE EDUCATION/TRAINING PROGRAM

## 2022-04-04 PROCEDURE — G8427 DOCREV CUR MEDS BY ELIG CLIN: HCPCS | Performed by: STUDENT IN AN ORGANIZED HEALTH CARE EDUCATION/TRAINING PROGRAM

## 2022-04-04 PROCEDURE — 3017F COLORECTAL CA SCREEN DOC REV: CPT | Performed by: STUDENT IN AN ORGANIZED HEALTH CARE EDUCATION/TRAINING PROGRAM

## 2022-04-04 PROCEDURE — G9899 SCRN MAM PERF RSLTS DOC: HCPCS | Performed by: STUDENT IN AN ORGANIZED HEALTH CARE EDUCATION/TRAINING PROGRAM

## 2022-04-04 PROCEDURE — G8753 SYS BP > OR = 140: HCPCS | Performed by: STUDENT IN AN ORGANIZED HEALTH CARE EDUCATION/TRAINING PROGRAM

## 2022-04-04 PROCEDURE — G8754 DIAS BP LESS 90: HCPCS | Performed by: STUDENT IN AN ORGANIZED HEALTH CARE EDUCATION/TRAINING PROGRAM

## 2022-04-04 PROCEDURE — G0439 PPPS, SUBSEQ VISIT: HCPCS | Performed by: STUDENT IN AN ORGANIZED HEALTH CARE EDUCATION/TRAINING PROGRAM

## 2022-04-04 PROCEDURE — G8536 NO DOC ELDER MAL SCRN: HCPCS | Performed by: STUDENT IN AN ORGANIZED HEALTH CARE EDUCATION/TRAINING PROGRAM

## 2022-04-04 PROCEDURE — 99214 OFFICE O/P EST MOD 30 MIN: CPT | Performed by: STUDENT IN AN ORGANIZED HEALTH CARE EDUCATION/TRAINING PROGRAM

## 2022-04-04 RX ORDER — BUSPIRONE HYDROCHLORIDE 10 MG/1
10 TABLET ORAL
COMMUNITY
End: 2022-08-08

## 2022-04-04 RX ORDER — SPIRONOLACTONE 25 MG
TABLET ORAL
COMMUNITY

## 2022-04-04 RX ORDER — BISMUTH SUBSALICYLATE 262 MG
1 TABLET,CHEWABLE ORAL DAILY
COMMUNITY

## 2022-04-04 RX ORDER — LEVOTHYROXINE SODIUM 75 UG/1
75 TABLET ORAL
Qty: 90 TABLET | Refills: 3 | Status: SHIPPED | OUTPATIENT
Start: 2022-04-04
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
## 2022-04-04 NOTE — PROGRESS NOTES
Name and  Verified. Pharmacy verified    Chief Complaint   Patient presents with    Fatigue     x 1 week    Hypothyroidism     2021     Started taking an additional  Does of Synthroid and B12    Patient willing to have labs drawn today. 1. Have you been to the ER, urgent care clinic since your last visit? Hospitalized since your last visit? Yes  [facility])  3/28/2022  Flu Like System  NEGATIVE for COVID    2. Have you seen or consulted any other health care providers outside of the [address] [place] [address] since your last visit? Include any pap smears or colon screening. No      Health Maintenance Due   Topic Date Due    Depression Screen  Never done    A1C test (Diabetic or Prediabetic)  2021    Lipid Screen  2021    Colorectal Cancer Screening Combo  2021    Medicare Yearly Exam  2022     Cologuard completed on 2021 Scanned in to the system.
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
## 2022-04-21 NOTE — PROGRESS NOTES
Reviewed. Low TSH. Expected as patient took higher doses of TSH per her own report. Will recheck in 6 weeks now that she has been back on the normal/usual dose. High B12.

## 2022-05-02 ENCOUNTER — TRANSCRIBE ORDER (OUTPATIENT)
Dept: SCHEDULING | Age: 72
End: 2022-05-02

## 2022-05-02 DIAGNOSIS — Z12.31 VISIT FOR SCREENING MAMMOGRAM: Primary | ICD-10-CM

## 2022-05-25 ENCOUNTER — HOSPITAL ENCOUNTER (OUTPATIENT)
Dept: MAMMOGRAPHY | Age: 72
Discharge: HOME | End: 2022-05-25
Attending: STUDENT IN AN ORGANIZED HEALTH CARE EDUCATION/TRAINING PROGRAM
Payer: MEDICARE

## 2022-05-25 DIAGNOSIS — Z12.31 VISIT FOR SCREENING MAMMOGRAM: ICD-10-CM

## 2022-05-25 PROCEDURE — 77067 SCR MAMMO BI INCL CAD: CPT

## 2022-06-30 ENCOUNTER — VIRTUAL VISIT (OUTPATIENT)
Dept: FAMILY MEDICINE CLINIC | Age: 72
End: 2022-06-30
Payer: MEDICARE

## 2022-06-30 DIAGNOSIS — U07.1 COVID-19: Primary | ICD-10-CM

## 2022-06-30 PROCEDURE — G8427 DOCREV CUR MEDS BY ELIG CLIN: HCPCS | Performed by: STUDENT IN AN ORGANIZED HEALTH CARE EDUCATION/TRAINING PROGRAM

## 2022-06-30 PROCEDURE — 1090F PRES/ABSN URINE INCON ASSESS: CPT | Performed by: STUDENT IN AN ORGANIZED HEALTH CARE EDUCATION/TRAINING PROGRAM

## 2022-06-30 PROCEDURE — G8756 NO BP MEASURE DOC: HCPCS | Performed by: STUDENT IN AN ORGANIZED HEALTH CARE EDUCATION/TRAINING PROGRAM

## 2022-06-30 PROCEDURE — G9899 SCRN MAM PERF RSLTS DOC: HCPCS | Performed by: STUDENT IN AN ORGANIZED HEALTH CARE EDUCATION/TRAINING PROGRAM

## 2022-06-30 PROCEDURE — 3017F COLORECTAL CA SCREEN DOC REV: CPT | Performed by: STUDENT IN AN ORGANIZED HEALTH CARE EDUCATION/TRAINING PROGRAM

## 2022-06-30 PROCEDURE — G8432 DEP SCR NOT DOC, RNG: HCPCS | Performed by: STUDENT IN AN ORGANIZED HEALTH CARE EDUCATION/TRAINING PROGRAM

## 2022-06-30 PROCEDURE — 1101F PT FALLS ASSESS-DOCD LE1/YR: CPT | Performed by: STUDENT IN AN ORGANIZED HEALTH CARE EDUCATION/TRAINING PROGRAM

## 2022-06-30 PROCEDURE — 1123F ACP DISCUSS/DSCN MKR DOCD: CPT | Performed by: STUDENT IN AN ORGANIZED HEALTH CARE EDUCATION/TRAINING PROGRAM

## 2022-06-30 PROCEDURE — 99213 OFFICE O/P EST LOW 20 MIN: CPT | Performed by: STUDENT IN AN ORGANIZED HEALTH CARE EDUCATION/TRAINING PROGRAM

## 2022-06-30 PROCEDURE — G8399 PT W/DXA RESULTS DOCUMENT: HCPCS | Performed by: STUDENT IN AN ORGANIZED HEALTH CARE EDUCATION/TRAINING PROGRAM

## 2022-06-30 NOTE — PROGRESS NOTES
Name and  Verified. Pharmacy verified    Chief Complaint   Patient presents with    Positive For Covid-19     Home Test x 2 2022       Patient stated that it started out with a sore throat and fever. Cough with clear phlegm. Fatigue. Has not been taking BP meds that needs to be taken with food, but does not have an appt. Taking over the counter cough medication and Ibuprofen. 1. Have you been to the ER, urgent care clinic since your last visit? Hospitalized since your last visit? No    2. Have you seen or consulted any other health care providers outside of the [address] [place] [address] since your last visit? Include any pap smears or colon screening.  No     Health Maintenance Due   Topic Date Due    Colorectal Cancer Screening Combo  2021

## 2022-06-30 NOTE — PROGRESS NOTES
[address] Chitra Zunigapiter. [address] [facility], [address] [place] [address]  [phone]    Nunu Luna (: 1950) is a 74 y.o. female, established patient, here for evaluation of the following chief complaint(s): Covid    SUBJECTIVE:    Pt would like to be evaluated for the problem(s) listed above:    Covid-19:    Pt complaints of sore throat, cough, body aches, fever and nasal congestion for 2 days. Sore throat have improved. Had a fever of 101F last night but took Ibuprofen which brought it down to 99F. Also takes cough suppressants. Tested positive for covid-19 through home covid test (22). Breathing has been normal.  Denies any chest pain, shortness of breath, tachycardia, nausea/vomiting or wheezing. Does not have any history of chronic renal or hepatic disease. Of note, patient was in [facility] for 5 days and returned last weekend. Review of systems:     A comprehensive review of systems was negative except for that written in the History of Present Illness. PHYSICAL EXAM:    General: alert, cooperative, no distress   Mental  status: mental status: alert, oriented to person, place, and time, normal mood, behavior, speech, dress, motor activity, and thought processes   Resp: resp: normal effort and no respiratory distress   Neuro: neuro: no gross deficits   Skin: skin: no discoloration or lesions of concern on visible areas   Due to this being a TeleHealth evaluation, many elements of the physical examination are unable to be assessed. ASSESSMENT/PLAN:      ICD-10-CM ICD-9-CM    1. COVID-19  U07.1 079.89 nirmatrelvir-ritonavir (PAXLOVID) 150 mg x 2- 100 mg tablet     1. COVID-19  Stable but high risk patient. Will treat with paxlovid. -Start nirmatrelvir-ritonavir (PAXLOVID) 150 mg x 2- 100 mg tablet; Take 3 tablets (two 150 mg nirmatrelvir and one 100 mg ritonavir tablets) by mouth every 12 hours for 5 days.      - Continue conservative measures; Tylenol/NSAIDs for pain, Cepacol throat lozenges for sore throat, Mucinex DM  twice daily or Tessalon for cough, Flonase/saline rinse for congestion  -Discussed precautions with patient    -RTC prn for review if persistent, or go to the ER if worsening      No follow-ups on file. We discussed the expected course, resolution and complications of the diagnosis(es) in detail. Patient was in [place] at the time of consultation. Medication risks, benefits, costs, interactions, and alternatives were discussed as indicated. I advised her to contact the office if her condition worsens, changes or fails to improve as anticipated. She expressed understanding with the diagnosis(es) and plan. Patient understands that this encounter was a temporary measure, and the importance of further follow up and examination was emphasized. Patient verbalized understanding. Judy Garrison is being evaluated by a Virtual Visit (video visit) encounter to address concerns as mentioned above. A caregiver was present when appropriate. Due to this being a TeleHealth encounter (During [facility]- public [facility] emergency), evaluation of the following organ systems was limited: Vitals/Constitutional/EENT/Resp/CV/GI//MS/Neuro/Skin/Heme-Lymph-Imm. Pursuant to the emergency declaration under the [address] [place] [address] authority and the [employer] Act, this Virtual Visit was conducted with patient's (and/or legal guardian's) consent, to reduce the patient's risk of exposure to COVID-19 and provide necessary medical care. The patient (and/or legal guardian) has also been advised to contact this office for worsening conditions or problems, and seek emergency medical treatment and/or call 911 if deemed necessary.     Patient identification was verified at the start of the visit: YES    Services were provided through a video synchronous discussion virtually to substitute for in-person clinic visit. Patient was located at home and provider was located in office or at home. An electronic signature was used to authenticate this note. -- Kesha Staples MD     CPT Codes 64884-07949 for Established Patients may apply to this Telehealth Visit. POS code: 18.   Modifier GT

## 2022-08-08 ENCOUNTER — OFFICE VISIT (OUTPATIENT)
Dept: FAMILY MEDICINE CLINIC | Age: 72
End: 2022-08-08
Payer: MEDICARE

## 2022-08-08 VITALS
OXYGEN SATURATION: 97 % | HEART RATE: 71 BPM | HEIGHT: 65 IN | SYSTOLIC BLOOD PRESSURE: 148 MMHG | BODY MASS INDEX: 26.99 KG/M2 | TEMPERATURE: 96.9 F | RESPIRATION RATE: 16 BRPM | WEIGHT: 162 LBS | DIASTOLIC BLOOD PRESSURE: 74 MMHG

## 2022-08-08 DIAGNOSIS — F41.9 ANXIETY: ICD-10-CM

## 2022-08-08 DIAGNOSIS — E03.9 ACQUIRED HYPOTHYROIDISM: Primary | ICD-10-CM

## 2022-08-08 PROCEDURE — G8432 DEP SCR NOT DOC, RNG: HCPCS | Performed by: STUDENT IN AN ORGANIZED HEALTH CARE EDUCATION/TRAINING PROGRAM

## 2022-08-08 PROCEDURE — G8427 DOCREV CUR MEDS BY ELIG CLIN: HCPCS | Performed by: STUDENT IN AN ORGANIZED HEALTH CARE EDUCATION/TRAINING PROGRAM

## 2022-08-08 PROCEDURE — G9899 SCRN MAM PERF RSLTS DOC: HCPCS | Performed by: STUDENT IN AN ORGANIZED HEALTH CARE EDUCATION/TRAINING PROGRAM

## 2022-08-08 PROCEDURE — 1101F PT FALLS ASSESS-DOCD LE1/YR: CPT | Performed by: STUDENT IN AN ORGANIZED HEALTH CARE EDUCATION/TRAINING PROGRAM

## 2022-08-08 PROCEDURE — 99213 OFFICE O/P EST LOW 20 MIN: CPT | Performed by: STUDENT IN AN ORGANIZED HEALTH CARE EDUCATION/TRAINING PROGRAM

## 2022-08-08 PROCEDURE — 1090F PRES/ABSN URINE INCON ASSESS: CPT | Performed by: STUDENT IN AN ORGANIZED HEALTH CARE EDUCATION/TRAINING PROGRAM

## 2022-08-08 PROCEDURE — 3017F COLORECTAL CA SCREEN DOC REV: CPT | Performed by: STUDENT IN AN ORGANIZED HEALTH CARE EDUCATION/TRAINING PROGRAM

## 2022-08-08 PROCEDURE — G8754 DIAS BP LESS 90: HCPCS | Performed by: STUDENT IN AN ORGANIZED HEALTH CARE EDUCATION/TRAINING PROGRAM

## 2022-08-08 PROCEDURE — G8399 PT W/DXA RESULTS DOCUMENT: HCPCS | Performed by: STUDENT IN AN ORGANIZED HEALTH CARE EDUCATION/TRAINING PROGRAM

## 2022-08-08 PROCEDURE — G8536 NO DOC ELDER MAL SCRN: HCPCS | Performed by: STUDENT IN AN ORGANIZED HEALTH CARE EDUCATION/TRAINING PROGRAM

## 2022-08-08 PROCEDURE — G8753 SYS BP > OR = 140: HCPCS | Performed by: STUDENT IN AN ORGANIZED HEALTH CARE EDUCATION/TRAINING PROGRAM

## 2022-08-08 PROCEDURE — G8417 CALC BMI ABV UP PARAM F/U: HCPCS | Performed by: STUDENT IN AN ORGANIZED HEALTH CARE EDUCATION/TRAINING PROGRAM

## 2022-08-08 PROCEDURE — 1123F ACP DISCUSS/DSCN MKR DOCD: CPT | Performed by: STUDENT IN AN ORGANIZED HEALTH CARE EDUCATION/TRAINING PROGRAM

## 2022-08-08 RX ORDER — DIAZEPAM 2 MG/1
TABLET ORAL
Qty: 10 TABLET | Refills: 5 | Status: SHIPPED | OUTPATIENT
Start: 2022-08-08
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
## 2022-08-08 NOTE — PROGRESS NOTES
Name and  Verified. Pharmacy verified     Chief Complaint   Patient presents with    Follow-up     2022 Hypothyroidism       1. Have you been to the ER, urgent care clinic since your last visit? Hospitalized since your last visit? No    2. Have you seen or consulted any other health care providers outside of the [address] [place] [address] since your last visit? Include any pap smears or colon screening.  No      Health Maintenance Due   Topic Date Due    Colorectal Cancer Screening Combo  2021    COVID-19 Vaccine (4 - Booster for [facility] series) 2022

## 2022-08-09 LAB — TSH SERPL DL<=0.05 MIU/L-ACNC: 1.45 UIU/ML (ref 0.36–3.74)

## 2022-11-27 DIAGNOSIS — I10 ESSENTIAL HYPERTENSION: ICD-10-CM

## 2022-11-27 RX ORDER — LISINOPRIL 20 MG/1
TABLET ORAL
Qty: 90 TABLET | Refills: 3 | Status: SHIPPED | OUTPATIENT
Start: 2022-11-27

## 2022-12-14 DIAGNOSIS — E78.00 PURE HYPERCHOLESTEROLEMIA: ICD-10-CM

## 2022-12-15 RX ORDER — PRAVASTATIN SODIUM 20 MG/1
TABLET ORAL
Qty: 90 TABLET | Refills: 3 | Status: SHIPPED | OUTPATIENT
Start: 2022-12-15

## 2023-02-07 ENCOUNTER — TRANSCRIBE ORDER (OUTPATIENT)
Dept: SCHEDULING | Age: 73
End: 2023-02-07

## 2023-02-07 DIAGNOSIS — Z12.31 VISIT FOR SCREENING MAMMOGRAM: Primary | ICD-10-CM

## 2023-02-08 ENCOUNTER — OFFICE VISIT (OUTPATIENT)
Dept: FAMILY MEDICINE CLINIC | Age: 73
End: 2023-02-08
Payer: MEDICARE

## 2023-02-08 DIAGNOSIS — Z87.891 PERSONAL HISTORY OF TOBACCO USE, PRESENTING HAZARDS TO HEALTH: ICD-10-CM

## 2023-02-08 DIAGNOSIS — I10 ESSENTIAL HYPERTENSION: ICD-10-CM

## 2023-02-08 DIAGNOSIS — M81.0 OSTEOPOROSIS OF LUMBAR SPINE: ICD-10-CM

## 2023-02-08 DIAGNOSIS — E03.9 ACQUIRED HYPOTHYROIDISM: Primary | ICD-10-CM

## 2023-02-08 DIAGNOSIS — Z78.0 POSTMENOPAUSAL STATE: ICD-10-CM
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
## 2023-02-08 NOTE — PROGRESS NOTES
Name and  Verified. Pharmacy verified     Chief Complaint   Patient presents with    Follow-up     6 months 2022 Acquired hypothyroidism     Patient stated that she julissa discuss having a Colonoscopy with Dr. Rosalva Mancuso and decided not have one done since she will be 74years old next month. 1. Have you been to the ER, urgent care clinic since your last visit? Hospitalized since your last visit? No    2. Have you seen or consulted any other health care providers outside of the [address] [place] [address] since your last visit? Include any pap smears or colon screening.  No      Health Maintenance Due   Topic Date Due    Colorectal Cancer Screening Combo  2021

## 2023-02-09 VITALS
OXYGEN SATURATION: 98 % | RESPIRATION RATE: 16 BRPM | WEIGHT: 165 LBS | SYSTOLIC BLOOD PRESSURE: 154 MMHG | HEART RATE: 70 BPM | BODY MASS INDEX: 27.49 KG/M2 | TEMPERATURE: 97.5 F | HEIGHT: 65 IN | DIASTOLIC BLOOD PRESSURE: 82 MMHG

## 2023-02-09 LAB — TSH SERPL DL<=0.05 MIU/L-ACNC: 1.16 UIU/ML (ref 0.36–3.74)

## 2023-03-14 ENCOUNTER — HOSPITAL ENCOUNTER (OUTPATIENT)
Dept: CT IMAGING | Age: 73
Discharge: HOME | End: 2023-03-14
Attending: STUDENT IN AN ORGANIZED HEALTH CARE EDUCATION/TRAINING PROGRAM
Payer: MEDICARE

## 2023-03-14 DIAGNOSIS — Z87.891 PERSONAL HISTORY OF TOBACCO USE, PRESENTING HAZARDS TO HEALTH: ICD-10-CM

## 2023-03-14 PROCEDURE — 71271 CT THORAX LUNG CANCER SCR C-: CPT

## 2023-04-22 ENCOUNTER — TRANSCRIBE ORDERS (OUTPATIENT)
Facility: HOSPITAL | Age: 73
End: 2023-04-22

## 2023-04-22 DIAGNOSIS — Z12.31 VISIT FOR SCREENING MAMMOGRAM: Primary | ICD-10-CM

## 2023-04-22 DIAGNOSIS — Z78.0 POSTMENOPAUSAL STATUS: ICD-10-CM

## 2023-04-22 DIAGNOSIS — M81.0 OSTEOPOROSIS OF LUMBAR SPINE: ICD-10-CM

## 2023-05-26 ENCOUNTER — OFFICE VISIT (OUTPATIENT)
Age: 73
End: 2023-05-26
Payer: MEDICARE

## 2023-05-26 ENCOUNTER — HOSPITAL ENCOUNTER (OUTPATIENT)
Facility: HOSPITAL | Age: 73
End: 2023-05-26
Payer: MEDICARE

## 2023-05-26 VITALS
BODY MASS INDEX: 27.32 KG/M2 | HEART RATE: 73 BPM | OXYGEN SATURATION: 97 % | WEIGHT: 164 LBS | SYSTOLIC BLOOD PRESSURE: 143 MMHG | HEIGHT: 65 IN | TEMPERATURE: 98.5 F | DIASTOLIC BLOOD PRESSURE: 65 MMHG | RESPIRATION RATE: 16 BRPM

## 2023-05-26 DIAGNOSIS — Z12.31 VISIT FOR SCREENING MAMMOGRAM: ICD-10-CM

## 2023-05-26 DIAGNOSIS — Z78.0 POSTMENOPAUSAL STATUS: ICD-10-CM

## 2023-05-26 DIAGNOSIS — R73.03 PREDIABETES: ICD-10-CM

## 2023-05-26 DIAGNOSIS — F41.9 ANXIETY: ICD-10-CM

## 2023-05-26 DIAGNOSIS — I10 PRIMARY HYPERTENSION: Primary | ICD-10-CM

## 2023-05-26 DIAGNOSIS — E03.9 ACQUIRED HYPOTHYROIDISM: ICD-10-CM

## 2023-05-26 DIAGNOSIS — E78.2 MIXED HYPERLIPIDEMIA: ICD-10-CM

## 2023-05-26 DIAGNOSIS — M81.0 OSTEOPOROSIS OF LUMBAR SPINE: ICD-10-CM

## 2023-05-26 LAB
ANION GAP SERPL CALC-SCNC: 6 MMOL/L (ref 5–15)
BUN SERPL-MCNC: 20 MG/DL (ref 6–20)
BUN/CREAT SERPL: 27 (ref 12–20)
CALCIUM SERPL-MCNC: 9.2 MG/DL (ref 8.5–10.1)
CHLORIDE SERPL-SCNC: 108 MMOL/L (ref 97–108)
CHOLEST SERPL-MCNC: 239 MG/DL
CO2 SERPL-SCNC: 27 MMOL/L (ref 21–32)
CREAT SERPL-MCNC: 0.75 MG/DL (ref 0.55–1.02)
EST. AVERAGE GLUCOSE BLD GHB EST-MCNC: 103 MG/DL
GLUCOSE SERPL-MCNC: 101 MG/DL (ref 65–100)
HBA1C MFR BLD: 5.2 % (ref 4–5.6)
HDLC SERPL-MCNC: 115 MG/DL
HDLC SERPL: 2.1 (ref 0–5)
LDLC SERPL CALC-MCNC: 112.6 MG/DL (ref 0–100)
POTASSIUM SERPL-SCNC: 4.3 MMOL/L (ref 3.5–5.1)
SODIUM SERPL-SCNC: 141 MMOL/L (ref 136–145)
TRIGL SERPL-MCNC: 57 MG/DL
VLDLC SERPL CALC-MCNC: 11.4 MG/DL

## 2023-05-26 PROCEDURE — G8399 PT W/DXA RESULTS DOCUMENT: HCPCS | Performed by: STUDENT IN AN ORGANIZED HEALTH CARE EDUCATION/TRAINING PROGRAM

## 2023-05-26 PROCEDURE — 99214 OFFICE O/P EST MOD 30 MIN: CPT | Performed by: STUDENT IN AN ORGANIZED HEALTH CARE EDUCATION/TRAINING PROGRAM

## 2023-05-26 PROCEDURE — 1090F PRES/ABSN URINE INCON ASSESS: CPT | Performed by: STUDENT IN AN ORGANIZED HEALTH CARE EDUCATION/TRAINING PROGRAM

## 2023-05-26 PROCEDURE — 3017F COLORECTAL CA SCREEN DOC REV: CPT | Performed by: STUDENT IN AN ORGANIZED HEALTH CARE EDUCATION/TRAINING PROGRAM

## 2023-05-26 PROCEDURE — G8419 CALC BMI OUT NRM PARAM NOF/U: HCPCS | Performed by: STUDENT IN AN ORGANIZED HEALTH CARE EDUCATION/TRAINING PROGRAM

## 2023-05-26 PROCEDURE — 3077F SYST BP >= 140 MM HG: CPT | Performed by: STUDENT IN AN ORGANIZED HEALTH CARE EDUCATION/TRAINING PROGRAM

## 2023-05-26 PROCEDURE — 1123F ACP DISCUSS/DSCN MKR DOCD: CPT | Performed by: STUDENT IN AN ORGANIZED HEALTH CARE EDUCATION/TRAINING PROGRAM

## 2023-05-26 PROCEDURE — G8427 DOCREV CUR MEDS BY ELIG CLIN: HCPCS | Performed by: STUDENT IN AN ORGANIZED HEALTH CARE EDUCATION/TRAINING PROGRAM

## 2023-05-26 PROCEDURE — 77080 DXA BONE DENSITY AXIAL: CPT

## 2023-05-26 PROCEDURE — 1036F TOBACCO NON-USER: CPT | Performed by: STUDENT IN AN ORGANIZED HEALTH CARE EDUCATION/TRAINING PROGRAM

## 2023-05-26 PROCEDURE — 3078F DIAST BP <80 MM HG: CPT | Performed by: STUDENT IN AN ORGANIZED HEALTH CARE EDUCATION/TRAINING PROGRAM

## 2023-05-26 PROCEDURE — 77063 BREAST TOMOSYNTHESIS BI: CPT

## 2023-05-26 RX ORDER — LISINOPRIL 20 MG/1
20 TABLET ORAL DAILY
Qty: 90 TABLET | Refills: 2
Start: 2023-05-26

## 2023-05-26 RX ORDER — LEVOTHYROXINE SODIUM 0.07 MG/1
75 TABLET ORAL
Qty: 90 TABLET | Refills: 3
Start: 2023-05-26

## 2023-05-26 RX ORDER — PRAVASTATIN SODIUM 20 MG
20 TABLET ORAL NIGHTLY
Qty: 90 TABLET | Refills: 2
Start: 2023-05-26

## 2023-05-26 RX ORDER — DIAZEPAM 2 MG/1
TABLET ORAL
Qty: 10 TABLET | Refills: 5 | Status: SHIPPED | OUTPATIENT
Start: 2023-05-26 | End: 2024-05-24

## 2023-05-26 RX ORDER — DIAZEPAM 2 MG/1
TABLET ORAL
Qty: 10 TABLET | Refills: 1 | Status: CANCELLED | OUTPATIENT
Start: 2023-05-26

## 2023-05-26 ASSESSMENT — PATIENT HEALTH QUESTIONNAIRE - PHQ9
1. LITTLE INTEREST OR PLEASURE IN DOING THINGS: 0
SUM OF ALL RESPONSES TO PHQ QUESTIONS 1-9: 0
2. FEELING DOWN, DEPRESSED OR HOPELESS: 0
SUM OF ALL RESPONSES TO PHQ9 QUESTIONS 1 & 2: 0
SUM OF ALL RESPONSES TO PHQ QUESTIONS 1-9: 0
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
## 2023-05-26 NOTE — PROGRESS NOTES
Name and Date of Birth Verified    Pharmacy Verified    Chief Complaint   Patient presents with    Follow-up     2/8/2023 Hypothyroidism     Patient not fasting, [facility]. 1. \"Have you been to the ER, urgent care clinic since your last visit? Hospitalized since your last visit? \" No    2. \"Have you seen or consulted any other health care providers outside of the [address] [place] [address] since your last visit? \" Yes [facility]. 3. For patients aged 39-70: Has the patient had a colonoscopy / FIT/ Cologuard? No      If the patient is female:    4. For patients aged 41-77: Has the patient had a mammogram within the past 2 years? Yes 5/26/2023 Scheduled      5. For patients aged 21-65: Has the patient had a pap smear?  No     Health Maintenance Due   Topic Date Due    Colorectal Cancer Screen  12/18/2021    A1C test (Diabetic or Prediabetic)  04/04/2023    Lipids  04/04/2023    Annual Wellness Visit (AWV)  04/05/2023

## 2023-05-29 DIAGNOSIS — E03.9 ACQUIRED HYPOTHYROIDISM: ICD-10-CM

## 2023-05-31 RX ORDER — LEVOTHYROXINE SODIUM 0.07 MG/1
TABLET ORAL
Qty: 90 TABLET | Refills: 3 | OUTPATIENT
Start: 2023-05-31

## 2023-06-27 DIAGNOSIS — E03.9 ACQUIRED HYPOTHYROIDISM: ICD-10-CM

## 2023-06-27 RX ORDER — LEVOTHYROXINE SODIUM 0.07 MG/1
75 TABLET ORAL
Qty: 90 TABLET | Refills: 3 | Status: SHIPPED | OUTPATIENT
Start: 2023-06-27

## 2023-06-28 ENCOUNTER — CLINICAL DOCUMENTATION (OUTPATIENT)
Age: 73
End: 2023-06-28

## 2023-06-28 PROBLEM — S62.101A WRIST FRACTURE, RIGHT: Status: ACTIVE | Noted: 2023-06-28

## 2023-06-28 PROBLEM — M81.0 OSTEOPOROSIS: Status: ACTIVE | Noted: 2023-06-28

## 2023-07-11 ENCOUNTER — CLINICAL DOCUMENTATION (OUTPATIENT)
Age: 73
End: 2023-07-11

## 2023-07-18 ENCOUNTER — HOSPITAL ENCOUNTER (OUTPATIENT)
Facility: HOSPITAL | Age: 73
Setting detail: INFUSION SERIES
End: 2023-07-18
Payer: MEDICARE

## 2023-07-18 VITALS
BODY MASS INDEX: 27.29 KG/M2 | HEART RATE: 81 BPM | SYSTOLIC BLOOD PRESSURE: 167 MMHG | RESPIRATION RATE: 16 BRPM | DIASTOLIC BLOOD PRESSURE: 86 MMHG | WEIGHT: 164.02 LBS | TEMPERATURE: 97.9 F

## 2023-07-18 DIAGNOSIS — M81.0 OSTEOPOROSIS, UNSPECIFIED OSTEOPOROSIS TYPE, UNSPECIFIED PATHOLOGICAL FRACTURE PRESENCE: Primary | ICD-10-CM

## 2023-07-18 LAB
ANION GAP BLD CALC-SCNC: 8 MMOL/L (ref 10–20)
CA-I BLD-MCNC: 1.23 MMOL/L (ref 1.12–1.32)
CHLORIDE BLD-SCNC: 107 MMOL/L (ref 98–107)
CO2 BLD-SCNC: 26.8 MMOL/L (ref 21–32)
CREAT BLD-MCNC: 0.79 MG/DL (ref 0.6–1.3)
POTASSIUM BLD-SCNC: 4.6 MMOL/L (ref 3.5–5.1)
SERVICE CMNT-IMP: ABNORMAL
SODIUM BLD-SCNC: 141 MMOL/L (ref 136–145)

## 2023-07-18 PROCEDURE — 2580000003 HC RX 258: Performed by: STUDENT IN AN ORGANIZED HEALTH CARE EDUCATION/TRAINING PROGRAM

## 2023-07-18 PROCEDURE — 80047 BASIC METABLC PNL IONIZED CA: CPT

## 2023-07-18 PROCEDURE — 6360000002 HC RX W HCPCS: Performed by: STUDENT IN AN ORGANIZED HEALTH CARE EDUCATION/TRAINING PROGRAM

## 2023-07-18 PROCEDURE — 96374 THER/PROPH/DIAG INJ IV PUSH: CPT

## 2023-07-18 RX ORDER — SODIUM CHLORIDE 9 MG/ML
5-250 INJECTION, SOLUTION INTRAVENOUS PRN
Status: DISCONTINUED | OUTPATIENT
Start: 2023-07-18 | End: 2023-07-19 | Stop reason: HOSPADM

## 2023-07-18 RX ORDER — CITALOPRAM 20 MG/1
TABLET ORAL
COMMUNITY
Start: 2015-01-20

## 2023-07-18 RX ORDER — BUSPIRONE HYDROCHLORIDE 10 MG/1
TABLET ORAL
COMMUNITY
Start: 2020-10-27

## 2023-07-18 RX ORDER — ZOLEDRONIC ACID 5 MG/100ML
5 INJECTION, SOLUTION INTRAVENOUS ONCE
Status: COMPLETED | OUTPATIENT
Start: 2023-07-18 | End: 2023-07-18

## 2023-07-18 RX ORDER — SODIUM CHLORIDE 9 MG/ML
5-250 INJECTION, SOLUTION INTRAVENOUS PRN
Status: CANCELLED | OUTPATIENT
Start: 2023-07-18

## 2023-07-18 RX ORDER — ZOLEDRONIC ACID 5 MG/100ML
5 INJECTION, SOLUTION INTRAVENOUS ONCE
Status: CANCELLED | OUTPATIENT
Start: 2023-07-18 | End: 2023-07-18

## 2023-07-18 RX ORDER — ALENDRONATE SODIUM 70 MG/1
TABLET ORAL
COMMUNITY
Start: 2015-02-05

## 2023-07-18 RX ADMIN — ZOLEDRONIC ACID 5 MG: 0.05 INJECTION, SOLUTION INTRAVENOUS at 12:10

## 2023-07-18 RX ADMIN — SODIUM CHLORIDE 25 ML/HR: 9 INJECTION, SOLUTION INTRAVENOUS at 11:10

## 2023-08-29 ENCOUNTER — OFFICE VISIT (OUTPATIENT)
Age: 73
End: 2023-08-29
Payer: MEDICARE

## 2023-08-29 VITALS
HEIGHT: 65 IN | TEMPERATURE: 97.9 F | BODY MASS INDEX: 27.16 KG/M2 | WEIGHT: 163 LBS | SYSTOLIC BLOOD PRESSURE: 110 MMHG | RESPIRATION RATE: 16 BRPM | DIASTOLIC BLOOD PRESSURE: 68 MMHG | HEART RATE: 62 BPM | OXYGEN SATURATION: 99 %

## 2023-08-29 DIAGNOSIS — M25.511 ACUTE PAIN OF RIGHT SHOULDER: Primary | ICD-10-CM

## 2023-08-29 PROCEDURE — G8427 DOCREV CUR MEDS BY ELIG CLIN: HCPCS | Performed by: STUDENT IN AN ORGANIZED HEALTH CARE EDUCATION/TRAINING PROGRAM

## 2023-08-29 PROCEDURE — 99213 OFFICE O/P EST LOW 20 MIN: CPT | Performed by: STUDENT IN AN ORGANIZED HEALTH CARE EDUCATION/TRAINING PROGRAM

## 2023-08-29 PROCEDURE — 1123F ACP DISCUSS/DSCN MKR DOCD: CPT | Performed by: STUDENT IN AN ORGANIZED HEALTH CARE EDUCATION/TRAINING PROGRAM

## 2023-08-29 PROCEDURE — G8419 CALC BMI OUT NRM PARAM NOF/U: HCPCS | Performed by: STUDENT IN AN ORGANIZED HEALTH CARE EDUCATION/TRAINING PROGRAM

## 2023-08-29 PROCEDURE — 3017F COLORECTAL CA SCREEN DOC REV: CPT | Performed by: STUDENT IN AN ORGANIZED HEALTH CARE EDUCATION/TRAINING PROGRAM

## 2023-08-29 PROCEDURE — 1090F PRES/ABSN URINE INCON ASSESS: CPT | Performed by: STUDENT IN AN ORGANIZED HEALTH CARE EDUCATION/TRAINING PROGRAM

## 2023-08-29 PROCEDURE — G8399 PT W/DXA RESULTS DOCUMENT: HCPCS | Performed by: STUDENT IN AN ORGANIZED HEALTH CARE EDUCATION/TRAINING PROGRAM

## 2023-08-29 PROCEDURE — 1036F TOBACCO NON-USER: CPT | Performed by: STUDENT IN AN ORGANIZED HEALTH CARE EDUCATION/TRAINING PROGRAM

## 2023-09-24 SDOH — HEALTH STABILITY: PHYSICAL HEALTH: ON AVERAGE, HOW MANY MINUTES DO YOU ENGAGE IN EXERCISE AT THIS LEVEL?: 50 MIN

## 2023-09-24 SDOH — HEALTH STABILITY: PHYSICAL HEALTH: ON AVERAGE, HOW MANY DAYS PER WEEK DO YOU ENGAGE IN MODERATE TO STRENUOUS EXERCISE (LIKE A BRISK WALK)?: 7 DAYS

## 2023-09-24 ASSESSMENT — PATIENT HEALTH QUESTIONNAIRE - PHQ9
2. FEELING DOWN, DEPRESSED OR HOPELESS: 0
SUM OF ALL RESPONSES TO PHQ QUESTIONS 1-9: 0
SUM OF ALL RESPONSES TO PHQ9 QUESTIONS 1 & 2: 0
1. LITTLE INTEREST OR PLEASURE IN DOING THINGS: 0

## 2023-09-24 ASSESSMENT — LIFESTYLE VARIABLES
HOW OFTEN DURING THE LAST YEAR HAVE YOU BEEN UNABLE TO REMEMBER WHAT HAPPENED THE NIGHT BEFORE BECAUSE YOU HAD BEEN DRINKING: NEVER
HOW MANY STANDARD DRINKS CONTAINING ALCOHOL DO YOU HAVE ON A TYPICAL DAY: 3 OR 4
HOW OFTEN DURING THE LAST YEAR HAVE YOU NEEDED AN ALCOHOLIC DRINK FIRST THING IN THE MORNING TO GET YOURSELF GOING AFTER A NIGHT OF HEAVY DRINKING: NEVER
HOW MANY STANDARD DRINKS CONTAINING ALCOHOL DO YOU HAVE ON A TYPICAL DAY: 2
HOW OFTEN DO YOU HAVE A DRINK CONTAINING ALCOHOL: 5
HOW OFTEN DURING THE LAST YEAR HAVE YOU NEEDED AN ALCOHOLIC DRINK FIRST THING IN THE MORNING TO GET YOURSELF GOING AFTER A NIGHT OF HEAVY DRINKING: 0
HOW OFTEN DURING THE LAST YEAR HAVE YOU FOUND THAT YOU WERE NOT ABLE TO STOP DRINKING ONCE YOU HAD STARTED: 0
HOW OFTEN DURING THE LAST YEAR HAVE YOU FAILED TO DO WHAT WAS NORMALLY EXPECTED FROM YOU BECAUSE OF DRINKING: 0
HAS A RELATIVE, FRIEND, DOCTOR, OR ANOTHER HEALTH PROFESSIONAL EXPRESSED CONCERN ABOUT YOUR DRINKING OR SUGGESTED YOU CUT DOWN: NO
HAS A RELATIVE, FRIEND, DOCTOR, OR ANOTHER HEALTH PROFESSIONAL EXPRESSED CONCERN ABOUT YOUR DRINKING OR SUGGESTED YOU CUT DOWN: 0
HOW OFTEN DO YOU HAVE A DRINK CONTAINING ALCOHOL: 4 OR MORE TIMES A WEEK
HOW OFTEN DURING THE LAST YEAR HAVE YOU HAD A FEELING OF GUILT OR REMORSE AFTER DRINKING: 0
HAVE YOU OR SOMEONE ELSE BEEN INJURED AS A RESULT OF YOUR DRINKING: 0
HAVE YOU OR SOMEONE ELSE BEEN INJURED AS A RESULT OF YOUR DRINKING: NO
HOW OFTEN DURING THE LAST YEAR HAVE YOU FOUND THAT YOU WERE NOT ABLE TO STOP DRINKING ONCE YOU HAD STARTED: NEVER
HOW OFTEN DURING THE LAST YEAR HAVE YOU BEEN UNABLE TO REMEMBER WHAT HAPPENED THE NIGHT BEFORE BECAUSE YOU HAD BEEN DRINKING: 0
HOW OFTEN DURING THE LAST YEAR HAVE YOU FAILED TO DO WHAT WAS NORMALLY EXPECTED FROM YOU BECAUSE OF DRINKING: NEVER
HOW OFTEN DO YOU HAVE SIX OR MORE DRINKS ON ONE OCCASION: 2
HOW OFTEN DURING THE LAST YEAR HAVE YOU HAD A FEELING OF GUILT OR REMORSE AFTER DRINKING: NEVER

## 2023-09-26 ENCOUNTER — OFFICE VISIT (OUTPATIENT)
Age: 73
End: 2023-09-26
Payer: MEDICARE

## 2023-09-26 VITALS
SYSTOLIC BLOOD PRESSURE: 127 MMHG | TEMPERATURE: 98 F | OXYGEN SATURATION: 97 % | WEIGHT: 159 LBS | RESPIRATION RATE: 16 BRPM | HEIGHT: 64 IN | BODY MASS INDEX: 27.14 KG/M2 | DIASTOLIC BLOOD PRESSURE: 82 MMHG | HEART RATE: 79 BPM

## 2023-09-26 DIAGNOSIS — F41.9 ANXIETY: ICD-10-CM

## 2023-09-26 DIAGNOSIS — Z12.11 COLON CANCER SCREENING: ICD-10-CM

## 2023-09-26 DIAGNOSIS — Z00.00 MEDICARE ANNUAL WELLNESS VISIT, SUBSEQUENT: Primary | ICD-10-CM

## 2023-09-26 PROCEDURE — G0439 PPPS, SUBSEQ VISIT: HCPCS | Performed by: STUDENT IN AN ORGANIZED HEALTH CARE EDUCATION/TRAINING PROGRAM

## 2023-09-26 PROCEDURE — 3017F COLORECTAL CA SCREEN DOC REV: CPT | Performed by: STUDENT IN AN ORGANIZED HEALTH CARE EDUCATION/TRAINING PROGRAM

## 2023-09-26 PROCEDURE — 1123F ACP DISCUSS/DSCN MKR DOCD: CPT | Performed by: STUDENT IN AN ORGANIZED HEALTH CARE EDUCATION/TRAINING PROGRAM

## 2023-09-26 RX ORDER — DIAZEPAM 2 MG/1
TABLET ORAL
Qty: 10 TABLET | Refills: 3 | Status: SHIPPED | OUTPATIENT
Start: 2023-09-26 | End: 2024-09-24

## 2023-09-26 NOTE — PROGRESS NOTES
Name and Date of Birth Verified    Pharmacy Verified    Chief Complaint   Patient presents with    Medicare AWV       1. \"Have you been to the ER, urgent care clinic since your last visit? Hospitalized since your last visit? \" No    2. \"Have you seen or consulted any other health care providers outside of the [address] [place] [address] since your last visit? \" No     3. For patients aged 43-73: Has the patient had a colonoscopy / FIT/ Cologuard? Yes 12/17/2021      If the patient is female:    4. For patients aged 43-66: Has the patient had a mammogram within the past 2 years? Yes 5/26/2023      5. For patients aged 21-65: Has the patient had a pap smear?  No     Health Maintenance Due   Topic Date Due    Colorectal Cancer Screen  12/18/2021    Annual Wellness Visit (AWV)  04/05/2023    Flu vaccine (1) 08/01/2023    COVID-19 Vaccine (5 - Moderna series) 09/14/2023
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
## 2023-09-26 NOTE — PROGRESS NOTES
[address]  [address]. [place], [address] [facility]  [phone]    Date of visit: 9/26/2023    This is an Subsequent Medicare Annual Wellness Visit (AWV), (Performed more than 12 months after effective date of Medicare Part B enrollment and 12 months after last preventive visit, Once in a lifetime)    I have reviewed the patient's medical history in detail and updated the computerized patient record. Swati Bhatti is a 74 y.o. female   History obtained from: the patient. Concerns today   (Patient understands that medical problems addressed today may incur additional cost as this is a preventive visit)  -see separate notes    History     Patient Active Problem List   Diagnosis    Hypothyroid    Spastic dysphonia    Increased glucose level    Prediabetes    Osteopenia    Pyuria    Fear of flying    Pure hypercholesterolemia    Ptosis    Vitamin D deficiency    Urinary, incontinence, stress female    Osteoporosis    Wrist fracture, right     Past Medical History:   Diagnosis Date    Acute bronchitis 7/11/14    [facility]    Advance directive discussed with patient 10/06/2016    Booklet given . Right to decide.     Allergic rhinitis 2/16/15    [facility]    Anxiety     Arthritis     knees    Blood in stool 2/24/15    office visit notes from [facility]    Bronchitis 4/9/14    pt first notes rec'd    Cigarette smoker     Colon polyp 2/27/15    estes 5 yr repeat    Contusion of knee, right 09/26/2017    tripped and fell [place] [facility] note    Depression     Droopy eyelid 6/15/16    Elevated blood pressure     Frontal sinusitis 6/5/16    PF [place]    Gastritis 5/18/15  9/4/15 f/u    per EGD report duodenitis Solomon Amaya [place]    History of chicken pox     Osteopenia     again 5/2016    Osteoporosis 4/2013    of the spine-hip ok per dexa    Pneumonia 2001    right middle lobe    Screening for glaucoma 6/15/16    OSMAN [facility]    Thyroid disease

## 2023-11-06 DIAGNOSIS — I10 PRIMARY HYPERTENSION: ICD-10-CM

## 2023-11-06 DIAGNOSIS — E78.2 MIXED HYPERLIPIDEMIA: ICD-10-CM

## 2023-11-06 RX ORDER — PRAVASTATIN SODIUM 20 MG
20 TABLET ORAL
Qty: 30 TABLET | Refills: 0 | Status: SHIPPED | OUTPATIENT
Start: 2023-11-06

## 2023-11-06 RX ORDER — LISINOPRIL 20 MG/1
20 TABLET ORAL DAILY
Qty: 30 TABLET | Refills: 0 | Status: SHIPPED | OUTPATIENT
Start: 2023-11-06

## 2023-12-04 DIAGNOSIS — I10 PRIMARY HYPERTENSION: ICD-10-CM

## 2023-12-06 RX ORDER — LISINOPRIL 20 MG/1
20 TABLET ORAL DAILY
Qty: 90 TABLET | Refills: 1 | Status: SHIPPED | OUTPATIENT
Start: 2023-12-06

## 2023-12-31 DIAGNOSIS — E78.2 MIXED HYPERLIPIDEMIA: ICD-10-CM

## 2024-01-03 NOTE — TELEPHONE ENCOUNTER
Last appointment: 9/26/23  Next appointment: 2/14/24  Previous refill encounter(s): 11/6/23 #30    Requested Prescriptions     Pending Prescriptions Disp Refills    pravastatin (PRAVACHOL) 20 MG tablet [Pharmacy Med Name: PRAVASTATIN SODIUM 20 MG TAB] 90 tablet 1     Sig: TAKE 1 TABLET BY MOUTH EVERY DAY AT NIGHT         For Pharmacy Admin Tracking Only    Program: Medication Refill  CPA in place:    Recommendation Provided To:   Intervention Detail: New Rx: 1, reason: Patient Preference  Intervention Accepted By:   Gap Closed?:    Time Spent (min): 5

## 2024-01-04 RX ORDER — PRAVASTATIN SODIUM 20 MG
20 TABLET ORAL
Qty: 90 TABLET | Refills: 1 | Status: SHIPPED | OUTPATIENT
Start: 2024-01-04

## 2024-02-14 ENCOUNTER — OFFICE VISIT (OUTPATIENT)
Age: 74
End: 2024-02-14
Payer: MEDICARE

## 2024-02-14 VITALS
RESPIRATION RATE: 16 BRPM | HEART RATE: 77 BPM | BODY MASS INDEX: 27.78 KG/M2 | SYSTOLIC BLOOD PRESSURE: 123 MMHG | OXYGEN SATURATION: 95 % | WEIGHT: 161.82 LBS | TEMPERATURE: 98.3 F | DIASTOLIC BLOOD PRESSURE: 56 MMHG

## 2024-02-14 DIAGNOSIS — F41.9 ANXIETY: Primary | ICD-10-CM

## 2024-02-14 DIAGNOSIS — J20.9 ACUTE BRONCHITIS, UNSPECIFIED ORGANISM: ICD-10-CM

## 2024-02-14 PROCEDURE — G8419 CALC BMI OUT NRM PARAM NOF/U: HCPCS | Performed by: STUDENT IN AN ORGANIZED HEALTH CARE EDUCATION/TRAINING PROGRAM

## 2024-02-14 PROCEDURE — G8427 DOCREV CUR MEDS BY ELIG CLIN: HCPCS | Performed by: STUDENT IN AN ORGANIZED HEALTH CARE EDUCATION/TRAINING PROGRAM

## 2024-02-14 PROCEDURE — G8399 PT W/DXA RESULTS DOCUMENT: HCPCS | Performed by: STUDENT IN AN ORGANIZED HEALTH CARE EDUCATION/TRAINING PROGRAM

## 2024-02-14 PROCEDURE — 1036F TOBACCO NON-USER: CPT | Performed by: STUDENT IN AN ORGANIZED HEALTH CARE EDUCATION/TRAINING PROGRAM

## 2024-02-14 PROCEDURE — 99213 OFFICE O/P EST LOW 20 MIN: CPT | Performed by: STUDENT IN AN ORGANIZED HEALTH CARE EDUCATION/TRAINING PROGRAM

## 2024-02-14 PROCEDURE — 1090F PRES/ABSN URINE INCON ASSESS: CPT | Performed by: STUDENT IN AN ORGANIZED HEALTH CARE EDUCATION/TRAINING PROGRAM

## 2024-02-14 PROCEDURE — 1123F ACP DISCUSS/DSCN MKR DOCD: CPT | Performed by: STUDENT IN AN ORGANIZED HEALTH CARE EDUCATION/TRAINING PROGRAM

## 2024-02-14 PROCEDURE — G8484 FLU IMMUNIZE NO ADMIN: HCPCS | Performed by: STUDENT IN AN ORGANIZED HEALTH CARE EDUCATION/TRAINING PROGRAM

## 2024-02-14 PROCEDURE — 3017F COLORECTAL CA SCREEN DOC REV: CPT | Performed by: STUDENT IN AN ORGANIZED HEALTH CARE EDUCATION/TRAINING PROGRAM

## 2024-02-14 RX ORDER — AZITHROMYCIN 250 MG/1
250 TABLET, FILM COATED ORAL SEE ADMIN INSTRUCTIONS
Qty: 6 TABLET | Refills: 0 | Status: SHIPPED | OUTPATIENT
Start: 2024-02-14 | End: 2024-02-19

## 2024-02-14 RX ORDER — DIAZEPAM 2 MG/1
TABLET ORAL
Qty: 30 TABLET | Refills: 2 | Status: SHIPPED | OUTPATIENT
Start: 2024-02-14 | End: 2025-02-12

## 2024-02-14 NOTE — PROGRESS NOTES
[facility]  [address] [place] [address] [phone]    Chief Complaint: chronic medical problems    Subjective  Rachel Robles is a 73 y.o. White (non-) female , established patient, here for evaluation of the concern(s) above;    PMHx: Anxiety, HTN, Osteoporosis , hypothyroidism, prediabetes here for follow up;    Anxiety - takes Diazepam prn for anxiety.  Has planned trips to [place] and to visit children. Needs refills as she takes her diazepam to help with flight.    HTN - Home BP generally at goal when checked.      Allergies - reviewed:   Allergies   Allergen Reactions    Augmentin [Amoxicillin-Pot Clavulanate] Nausea Only and Other (See Comments)       Past Medical History - reviewed:  Past Medical History:   Diagnosis Date    Acute bronchitis 7/11/14    [facility]    Advance directive discussed with patient 10/06/2016    Booklet given .Right to decide.    Allergic rhinitis 2/16/15    [facility] notes    Anxiety     Arthritis     knees    Blood in stool 2/24/15    [facility]    Bronchitis 4/9/14    [facility] notes rec'd    Cigarette smoker     Colon polyp 2/27/15    meera 5 yr repeat    Contusion of knee, right 09/26/2017    tripped and fell [place] [facility] note    Depression     Droopy eyelid 6/15/16    Elevated blood pressure     Frontal sinusitis 6/5/16    PF [place]    Gastritis 5/18/15  9/4/15 f/u    per EGD report duodenitis Solomon Meera    History of chicken pox     Osteopenia     again 5/2016    Osteoporosis 4/2013    of the spine-hip ok per dexa    Pneumonia 2001    right middle lobe    Screening for glaucoma 6/15/16    [facility]    Thyroid disease     Vasovagal syncope 3/2010    [facility] c[facility] in [place]    Vitamin D deficiency     Vitamin D deficiency 4/2013    Wrist fracture, right 5/28/14    [facility] notes rec'd fell off a [employer]-closed fracture       Depression screening:  PHQ-9

## 2024-02-14 NOTE — PROGRESS NOTES
Chief Complaint   Patient presents with    Cholesterol Problem       \"Have you been to the ER, urgent care clinic since your last visit?  Hospitalized since your last visit?\"    YES - When: approximately 4 months ago.  Where and Why: November- congestion  pt first .    “Have you seen or consulted any other health care providers outside of [facility] since your last visit?”    NO    “Have you had a colorectal cancer screening such as a colonoscopy/FIT/Cologuard?    NO          Vitals:    24 1044   BP: (!) 123/56   Pulse:    Resp:    Temp:    SpO2:       Health Maintenance Due   Topic Date Due    Colorectal Cancer Screen  2021    Annual Wellness Visit (Medicare Advantage)  2024      The patient, Rachel Robles, identity was verified by name and .

## 2024-03-04 ENCOUNTER — PATIENT MESSAGE (OUTPATIENT)
Age: 74
End: 2024-03-04

## 2024-03-04 DIAGNOSIS — Z12.2 SCREENING FOR MALIGNANT NEOPLASM OF RESPIRATORY ORGAN: Primary | ICD-10-CM

## 2024-03-12 ENCOUNTER — HOSPITAL ENCOUNTER (OUTPATIENT)
Facility: HOSPITAL | Age: 74
Discharge: HOME | End: 2024-03-15
Attending: STUDENT IN AN ORGANIZED HEALTH CARE EDUCATION/TRAINING PROGRAM
Payer: MEDICARE

## 2024-03-12 DIAGNOSIS — Z12.2 SCREENING FOR MALIGNANT NEOPLASM OF RESPIRATORY ORGAN: ICD-10-CM

## 2024-03-12 PROCEDURE — 71271 CT THORAX LUNG CANCER SCR C-: CPT

## 2024-05-29 DIAGNOSIS — I10 PRIMARY HYPERTENSION: ICD-10-CM

## 2024-05-30 NOTE — TELEPHONE ENCOUNTER
Last appointment: 2/14/24  Next appointment: 8/15/24  Previous refill encounter(s): 12/6/23 #90 with 1 refill    Requested Prescriptions     Pending Prescriptions Disp Refills    lisinopril (PRINIVIL;ZESTRIL) 20 MG tablet [Pharmacy Med Name: LISINOPRIL 20 MG TABLET] 90 tablet 3     Sig: TAKE 1 TABLET BY MOUTH EVERY DAY         For Pharmacy Admin Tracking Only    Program: Medication Refill  CPA in place:    Recommendation Provided To:   Intervention Detail: New Rx: 1, reason: Patient Preference  Intervention Accepted By:   Gap Closed?:    Time Spent (min): 5

## 2024-05-31 ENCOUNTER — HOSPITAL ENCOUNTER (OUTPATIENT)
Facility: HOSPITAL | Age: 74
End: 2024-05-31
Attending: STUDENT IN AN ORGANIZED HEALTH CARE EDUCATION/TRAINING PROGRAM
Payer: MEDICARE

## 2024-05-31 VITALS — BODY MASS INDEX: 27.49 KG/M2 | HEIGHT: 64 IN | WEIGHT: 161 LBS

## 2024-05-31 DIAGNOSIS — Z12.31 VISIT FOR SCREENING MAMMOGRAM: ICD-10-CM

## 2024-05-31 PROCEDURE — 77063 BREAST TOMOSYNTHESIS BI: CPT

## 2024-06-01 RX ORDER — LISINOPRIL 20 MG/1
20 TABLET ORAL DAILY
Qty: 90 TABLET | Refills: 3 | Status: SHIPPED | OUTPATIENT
Start: 2024-06-01

## 2024-06-26 DIAGNOSIS — E03.9 ACQUIRED HYPOTHYROIDISM: ICD-10-CM

## 2024-06-26 DIAGNOSIS — E78.2 MIXED HYPERLIPIDEMIA: ICD-10-CM

## 2024-06-26 RX ORDER — LEVOTHYROXINE SODIUM 0.07 MG/1
75 TABLET ORAL
Qty: 90 TABLET | Refills: 3 | Status: SHIPPED | OUTPATIENT
Start: 2024-06-26

## 2024-06-26 RX ORDER — PRAVASTATIN SODIUM 20 MG
20 TABLET ORAL
Qty: 90 TABLET | Refills: 3 | Status: SHIPPED | OUTPATIENT
Start: 2024-06-26

## 2024-06-26 NOTE — TELEPHONE ENCOUNTER
Last appointment: 2/14/24  Next appointment: 8/15/24  Previous refill encounter(s): 1/4/24 Pravachol #90 with 1 refill, 6/27/23 Synthroid    Requested Prescriptions     Pending Prescriptions Disp Refills    levothyroxine (SYNTHROID) 75 MCG tablet [Pharmacy Med Name: LEVOTHYROXINE 75 MCG TABLET] 90 tablet 3     Sig: TAKE 1 TABLET BY MOUTH EVERY DAY BEFORE BREAKFAST    pravastatin (PRAVACHOL) 20 MG tablet [Pharmacy Med Name: PRAVASTATIN SODIUM 20 MG TAB] 90 tablet 3     Sig: TAKE 1 TABLET BY MOUTH EVERY DAY AT NIGHT         For Pharmacy Admin Tracking Only    Program: Medication Refill  CPA in place:    Recommendation Provided To:   Intervention Detail: New Rx: 2, reason: Patient Preference  Intervention Accepted By:   Gap Closed?:    Time Spent (min): 5

## 2024-07-17 DIAGNOSIS — F41.9 ANXIETY: ICD-10-CM

## 2024-07-17 DIAGNOSIS — M81.0 OSTEOPOROSIS, UNSPECIFIED OSTEOPOROSIS TYPE, UNSPECIFIED PATHOLOGICAL FRACTURE PRESENCE: Primary | ICD-10-CM

## 2024-07-17 RX ORDER — DIAZEPAM 2 MG/1
TABLET ORAL
Qty: 30 TABLET | Refills: 2 | Status: SHIPPED | OUTPATIENT
Start: 2024-07-17 | End: 2025-07-16

## 2024-08-15 ENCOUNTER — OFFICE VISIT (OUTPATIENT)
Age: 74
End: 2024-08-15
Payer: MEDICARE

## 2024-08-15 VITALS
RESPIRATION RATE: 16 BRPM | BODY MASS INDEX: 26.91 KG/M2 | HEART RATE: 64 BPM | WEIGHT: 157.63 LBS | OXYGEN SATURATION: 99 % | SYSTOLIC BLOOD PRESSURE: 103 MMHG | HEIGHT: 64 IN | DIASTOLIC BLOOD PRESSURE: 62 MMHG | TEMPERATURE: 97.7 F

## 2024-08-15 DIAGNOSIS — I44.7 LEFT BUNDLE BRANCH BLOCK: ICD-10-CM

## 2024-08-15 DIAGNOSIS — E03.9 ACQUIRED HYPOTHYROIDISM: ICD-10-CM

## 2024-08-15 DIAGNOSIS — Z00.00 MEDICARE ANNUAL WELLNESS VISIT, SUBSEQUENT: Primary | ICD-10-CM

## 2024-08-15 DIAGNOSIS — I50.22 HEART FAILURE WITH MILDLY REDUCED EJECTION FRACTION (HFMREF) (HCC): ICD-10-CM

## 2024-08-15 PROBLEM — R55 SYNCOPE AND COLLAPSE: Status: ACTIVE | Noted: 2024-06-05

## 2024-08-15 PROCEDURE — 99213 OFFICE O/P EST LOW 20 MIN: CPT | Performed by: STUDENT IN AN ORGANIZED HEALTH CARE EDUCATION/TRAINING PROGRAM

## 2024-08-15 RX ORDER — LEVOTHYROXINE SODIUM 0.07 MG/1
75 TABLET ORAL
Qty: 90 TABLET | Refills: 3 | Status: SHIPPED | OUTPATIENT
Start: 2024-08-15

## 2024-08-15 RX ORDER — SPIRONOLACTONE 25 MG/1
12.5 TABLET ORAL DAILY
COMMUNITY
Start: 2024-07-16 | End: 2025-01-12

## 2024-08-15 RX ORDER — LACTOBACILLUS RHAMNOSUS GG 10B CELL
1 CAPSULE ORAL EVERY OTHER DAY
COMMUNITY

## 2024-08-15 RX ORDER — AMOXICILLIN 500 MG
CAPSULE ORAL
COMMUNITY
Start: 2019-04-28

## 2024-08-15 RX ORDER — MULTIVITAMIN,THERAPEUTIC
1 TABLET ORAL DAILY
COMMUNITY

## 2024-08-15 RX ORDER — ATORVASTATIN CALCIUM 40 MG/1
40 TABLET, FILM COATED ORAL NIGHTLY
COMMUNITY
Start: 2024-06-11 | End: 2025-06-11

## 2024-08-15 RX ORDER — CARVEDILOL 3.12 MG/1
3.12 TABLET ORAL 2 TIMES DAILY WITH MEALS
COMMUNITY
Start: 2024-06-11 | End: 2024-12-08

## 2024-08-15 RX ORDER — LIDOCAINE 4 G/G
1 PATCH TOPICAL DAILY PRN
COMMUNITY

## 2024-08-15 RX ORDER — EMPAGLIFLOZIN 10 MG/1
10 TABLET, FILM COATED ORAL DAILY
COMMUNITY
Start: 2024-06-06

## 2024-08-15 ASSESSMENT — PATIENT HEALTH QUESTIONNAIRE - PHQ9
SUM OF ALL RESPONSES TO PHQ QUESTIONS 1-9: 0
1. LITTLE INTEREST OR PLEASURE IN DOING THINGS: NOT AT ALL
SUM OF ALL RESPONSES TO PHQ QUESTIONS 1-9: 0
SUM OF ALL RESPONSES TO PHQ9 QUESTIONS 1 & 2: 0
SUM OF ALL RESPONSES TO PHQ QUESTIONS 1-9: 0
SUM OF ALL RESPONSES TO PHQ QUESTIONS 1-9: 0
2. FEELING DOWN, DEPRESSED OR HOPELESS: NOT AT ALL

## 2024-08-15 ASSESSMENT — LIFESTYLE VARIABLES
HOW OFTEN DURING THE LAST YEAR HAVE YOU HAD A FEELING OF GUILT OR REMORSE AFTER DRINKING: NEVER
HOW OFTEN DURING THE LAST YEAR HAVE YOU FAILED TO DO WHAT WAS NORMALLY EXPECTED FROM YOU BECAUSE OF DRINKING: NEVER
HOW OFTEN DURING THE LAST YEAR HAVE YOU BEEN UNABLE TO REMEMBER WHAT HAPPENED THE NIGHT BEFORE BECAUSE YOU HAD BEEN DRINKING: NEVER
HOW MANY STANDARD DRINKS CONTAINING ALCOHOL DO YOU HAVE ON A TYPICAL DAY: 1 OR 2
HOW OFTEN DO YOU HAVE A DRINK CONTAINING ALCOHOL: 4 OR MORE TIMES A WEEK
HAVE YOU OR SOMEONE ELSE BEEN INJURED AS A RESULT OF YOUR DRINKING: NO
HOW OFTEN DURING THE LAST YEAR HAVE YOU FOUND THAT YOU WERE NOT ABLE TO STOP DRINKING ONCE YOU HAD STARTED: NEVER
HAS A RELATIVE, FRIEND, DOCTOR, OR ANOTHER HEALTH PROFESSIONAL EXPRESSED CONCERN ABOUT YOUR DRINKING OR SUGGESTED YOU CUT DOWN: NO
HOW OFTEN DURING THE LAST YEAR HAVE YOU NEEDED AN ALCOHOLIC DRINK FIRST THING IN THE MORNING TO GET YOURSELF GOING AFTER A NIGHT OF HEAVY DRINKING: NEVER

## 2024-08-15 NOTE — PROGRESS NOTES
Chief Complaint   Patient presents with    Medicare AWV       \"Have you been to the ER, urgent care clinic since your last visit?  Hospitalized since your last visit?\"    YES - When: approximately 2 months ago.  Where and Why: [facility].    “Have you seen or consulted any other health care providers outside of [facility] since your last visit?”    Yes Cardiology            Click Here for Release of Records Request       There were no vitals filed for this visit.   Health Maintenance Due   Topic Date Due    Annual Wellness Visit (Medicare Advantage)  2024    COVID-19 Vaccine ( season) 2024    Lipids  2024    Flu vaccine (1) 2024        The patient, Rachel Robles, identity was verified by name and .

## 2024-08-15 NOTE — ASSESSMENT & PLAN NOTE
Monitored by specialist- no acute findings meriting change in the plan.  S/p MDT dual chamber PPM placement on 6/10/24.

## 2024-08-15 NOTE — PROGRESS NOTES
[facility]  [address] [place] [address] [phone]    Chief Complaint: chronic medical problems    Subjective  Rachel Robles is a 74 y.o. White (non-) female , established patient, here for evaluation of the concern(s) above;    PMHx: Anxiety, HTN, Osteoporosis , hypothyroidism, prediabetes here for follow up;    Hospital follow up:    Pt admitted at [facility] (6/5/2024 - 6/11/2024) after syncopal episodes.  Per hospital notes, she had witnessed episode of loss of consciousness.   She underwent MDT dual chamber PPM placement on 6/10/24 for syncope and LBBB.   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    Has follow up with cardiologist (EP), last saw NP a month ago.    Of note, patient states that she has been having intermittent dizziness since started on the new medications. States that home BP has overall been running low with lowest BP being 102/54 mmHg. Patient concerned that this could have to do with one of her new medications.  Denies passing out again.    Pt has been staying well hydrated.      Anxiety - takes Diazepam prn for anxiety.  Has planned trips to [place] and to visit children. Needs refills as she takes her diazepam to help with flight.      Allergies - reviewed:   Allergies   Allergen Reactions    Augmentin [Amoxicillin-Pot Clavulanate] Nausea Only and Other (See Comments)       Past Medical History - reviewed:  Past Medical History:   Diagnosis Date    Acute bronchitis 7/11/14    [facility]    Advance directive discussed with patient 10/06/2016    Booklet given .Right to decide.    Allergic rhinitis 2/16/15    [facility] notes    Anxiety     Arthritis     knees    Blood in stool 2/24/15    [facility] visit [facility]    Bronchitis 4/9/14    [facility] notes rec'd    Cigarette smoker     Colon polyp

## 2024-08-15 NOTE — PROGRESS NOTES
Medicare Annual Wellness Visit    Rachel Robles is here for Medicare AWV    Assessment & Plan   Medicare annual wellness visit, subsequent  Left bundle branch block  Assessment & Plan:   Monitored by specialist- no acute findings meriting change in the plan.  S/p MDT dual chamber PPM placement on 6/10/24.  Acquired hypothyroidism  -     levothyroxine (SYNTHROID) 75 MCG tablet; Take 1 tablet by mouth every morning (before breakfast), Disp-90 tablet, R-3Normal  Heart failure with mildly reduced ejection fraction (HFmrEF) ([facility], [facility])  Assessment & Plan:  -Patient Blood pressure too low causing dizziness.   -recommend holding Spironolactone for now given BP likely not tolerating.  -on GDMT for CHF.  -Beta-blocker: Continue coreg  -ACEi/ARB/ARNI: - continue Lisinopril 20mg daily  -MRA:BP too low with associated side effects, holding spironolactone for now  -SGLT2i: On Jardiance 10 mg daily  -Euvolemic  - Reinforced low salt diet (<2g/day)  - Reinforced fluid restriction to 6 x 8oz glasses per day  - follow up with cards      Recommendations for Preventive Services Due: see orders and patient instructions/AVS.  Recommended screening schedule for the next 5-10 years is provided to the patient in written form: see Patient Instructions/AVS.     Return in 3 months (on 11/15/2024).     Subjective       Patient's complete Health Risk Assessment and screening values have been reviewed and are found in Flowsheets. The following problems were reviewed today and where indicated follow up appointments were made and/or referrals ordered.    Positive Risk Factor Screenings with Interventions:    Fall Risk:  Do you feel unsteady or are you worried about falling? : (!) yes  2 or more falls in past year?: no  Fall with injury in past year?: no     Interventions:    Reviewed medications, home hazards, visual acuity, and co-morbidities that can increase risk for falls       Drug Use:   Substance and Sexual Activity   Drug Use Yes    Types:

## 2024-08-15 NOTE — ASSESSMENT & PLAN NOTE
-Stress test (6/7/2024) - reports Dyskinesis of the septum. Mildly reduced LV systolic function. LV ejection fraction is 47%   -Patient Blood pressure too low causing dizziness.   -recommend holding Spironolactone for now given BP likely not tolerating.  -on GDMT for CHF.  -Beta-blocker: Continue coreg  -ACEi/ARB/ARNI: - continue Lisinopril 20mg daily  -MRA:BP too low with associated side effects, holding spironolactone for now  -SGLT2i: On Jardiance 10 mg daily  -Euvolemic  - Reinforced low salt diet (<2g/day)  - Reinforced fluid restriction to 6 x 8oz glasses per day  - follow up with cards

## 2024-08-16 LAB
LEFT VENTRICULAR EJECTION FRACTION MODE: NORMAL
LV EF: 40 %

## 2024-09-10 LAB — NONINV COLON CA DNA+OCC BLD SCRN STL QL: NORMAL

## 2024-11-15 ENCOUNTER — OFFICE VISIT (OUTPATIENT)
Age: 74
End: 2024-11-15
Payer: MEDICARE

## 2024-11-15 VITALS
DIASTOLIC BLOOD PRESSURE: 64 MMHG | BODY MASS INDEX: 25.97 KG/M2 | OXYGEN SATURATION: 99 % | WEIGHT: 152.12 LBS | RESPIRATION RATE: 16 BRPM | HEIGHT: 64 IN | TEMPERATURE: 97.4 F | SYSTOLIC BLOOD PRESSURE: 100 MMHG | HEART RATE: 80 BPM

## 2024-11-15 DIAGNOSIS — I10 PRIMARY HYPERTENSION: ICD-10-CM

## 2024-11-15 DIAGNOSIS — F41.9 ANXIETY: ICD-10-CM

## 2024-11-15 DIAGNOSIS — E03.9 ACQUIRED HYPOTHYROIDISM: ICD-10-CM

## 2024-11-15 PROCEDURE — 99214 OFFICE O/P EST MOD 30 MIN: CPT | Performed by: STUDENT IN AN ORGANIZED HEALTH CARE EDUCATION/TRAINING PROGRAM

## 2024-11-15 RX ORDER — LISINOPRIL 10 MG/1
10 TABLET ORAL DAILY
Qty: 90 TABLET | Refills: 2 | Status: SHIPPED | OUTPATIENT
Start: 2024-11-15

## 2024-11-15 NOTE — ASSESSMENT & PLAN NOTE
Chronic, at goal (stable), will lower her lisinopril to 10mg given low Bps at home and intermittent dizziness, medication adherence emphasized, and lifestyle modifications recommended

## 2024-11-15 NOTE — PROGRESS NOTES
Chief Complaint   Patient presents with    3 Month Follow-Up     8/15/2024 Thyroid & Anxiety     \"Have you been to the ER, urgent care clinic since your last visit?  Hospitalized since your last visit?\"    NO    “Have you seen or consulted any other health care providers outside of [facility] since your last visit?”      Yes  2024  [facility]   Cardiology   Congestive heart failure, unspecified HF chronicity      Vitals:    11/15/24 1117 11/15/24 1130   BP: (!) 99/56 100/64   Site: Left Upper Arm Right Upper Arm   Position: Sitting Sitting   Cuff Size: Medium Adult Medium Adult   Pulse: 80    Resp: 16    Temp: 97.4 °F (36.3 °C)    TempSrc: Temporal    SpO2: 99%    Weight: 69 kg (152 lb 1.9 oz)    Height: 1.626 m (5' 4\")       Health Maintenance Due   Topic Date Due    Lipids  2024      The patient, Rachel Robles, identity was verified by name and , pharmacy verified  Labs:Yes  Fasting:No HP [facility]

## 2024-11-15 NOTE — PROGRESS NOTES
[facility]  [address] [place] [address] [phone]    Chief Complaint: chronic medical problems    Subjective  Rachel Robles is a 74 y.o. White (non-) female , established patient, here for evaluation of the concern(s) above;    PMHx: Anxiety, Osteoporosis , Chronic right Shoulder pain, hypothyroidism, cardimyopathy/LBBB/Syncope s/p PPM (HFmrEF 40-45% with apical hypokinesis) and prediabetes here for follow up;    Hypothyroidism:  On synthroid    HTN:  On lisinopril 20mg, and coreg.  States that she would have episodic dizziness.    Anxiety - takes Diazepam prn for anxiety.    Cardimyopathy/LBBB/Syncope s/p PPM (HFmrEF 40-45% with apical hypokinesis):  on Lisinopril, jardiance coreg and lipitor.  Not interested in Entresto.  Follows with cards.    Previous meds -  Spironolactone - did not tolerate.    Social History:  - (Mr. Deangelo Robles) also pt at the practice.      Allergies - reviewed:   Allergies   Allergen Reactions    Augmentin [Amoxicillin-Pot Clavulanate] Nausea Only and Other (See Comments)       Past Medical History - reviewed:  Past Medical History:   Diagnosis Date    Acute bronchitis 7/11/14    [facility]    Advance directive discussed with patient 10/06/2016    Booklet given .Right to decide.    Allergic rhinitis 2/16/15    [facility] notes    Anxiety     Arthritis     knees    Blood in stool 2/24/15    office visit notes from [facility]    Bronchitis 4/9/14    pt first notes rec'd    Cigarette smoker     Colon polyp 2/27/15    meera 5 yr repeat    Contusion of knee, right 09/26/2017    tripped and fell [place] [facility] note    Depression     Droopy eyelid 6/15/16    Elevated blood pressure     Frontal sinusitis 6/5/16    PF [place]    Gastritis 5/18/15  9/4/15 f/u    per EGD report duodenitis Solomon Daily    Heart block bundle branch     History of chicken pox     Osteopenia     again 5/2016    Osteoporosis

## 2025-01-30 NOTE — TELEPHONE ENCOUNTER
Verbal Order  give with Readback for reclast infusions per Dr Schwarz   [General Appearance - Well Developed] : well developed [General Appearance - Well Nourished] : well nourished [General Appearance - In No Acute Distress] : no acute distress [Abdomen Soft] : soft [Costovertebral Angle Tenderness] : no ~M costovertebral angle tenderness

## 2025-02-05 DIAGNOSIS — F41.9 ANXIETY: ICD-10-CM

## 2025-02-06 RX ORDER — DIAZEPAM 2 MG/1
1-2 TABLET ORAL DAILY PRN
Qty: 30 TABLET | Refills: 2 | Status: SHIPPED | OUTPATIENT
Start: 2025-02-06 | End: 2025-05-07

## 2025-02-06 NOTE — TELEPHONE ENCOUNTER
Previous Rx did not have a frequency indicated. I have pended this Rx with frequency of \"daily PRN\". Please edit as necessary.    Last appointment: 11/15/24  Next appointment: Advised to follow-up 5/15/25  Previous refill encounter(s): 7/17/24 #30 with 2 refills    Requested Prescriptions     Pending Prescriptions Disp Refills    diazePAM (VALIUM) 2 MG tablet [Pharmacy Med Name: DIAZEPAM 2 MG TABLET] 30 tablet 2     Sig: Take 0.5-1 tablets by mouth daily as needed for Anxiety for up to 90 days. Max Daily Amount: 2 mg         For Pharmacy Admin Tracking Only    Program: Medication Refill  CPA in place:    Recommendation Provided To:   Intervention Detail: New Rx: 1, reason: Patient Preference  Intervention Accepted By:   Gap Closed?:    Time Spent (min): 5

## 2025-02-11 DIAGNOSIS — Z12.2 SCREENING FOR MALIGNANT NEOPLASM OF RESPIRATORY ORGAN: Primary | ICD-10-CM

## 2025-03-13 ENCOUNTER — HOSPITAL ENCOUNTER (OUTPATIENT)
Facility: HOSPITAL | Age: 75
Discharge: HOME | End: 2025-03-16
Attending: STUDENT IN AN ORGANIZED HEALTH CARE EDUCATION/TRAINING PROGRAM
Payer: MEDICARE

## 2025-03-13 DIAGNOSIS — Z12.2 SCREENING FOR MALIGNANT NEOPLASM OF RESPIRATORY ORGAN: ICD-10-CM

## 2025-03-13 PROCEDURE — 71271 CT THORAX LUNG CANCER SCR C-: CPT

## 2025-03-14 ENCOUNTER — RESULTS FOLLOW-UP (OUTPATIENT)
Facility: HOSPITAL | Age: 75
End: 2025-03-14

## 2025-03-25 ENCOUNTER — TELEPHONE (OUTPATIENT)
Dept: PHARMACY | Facility: CLINIC | Age: 75
End: 2025-03-25

## 2025-03-25 NOTE — TELEPHONE ENCOUNTER
[facility]: ADHERENCE REVIEW  Identified care gap per [place] fills with [facility]: Diabetes adherence    Medicare Advantage - MRMA  Per insurer report, LIS-0 - co-pays are based on tiers and patient is subject to coverage gap.  Patient also appears to be prescribed: ACE/ARB and Statin    ASSESSMENT    ACE/ARB ADHERENCE    Insurance Records claims through  25  (Prior Year PDC = 99% - PASSED ; YTD PDC = FIRST FILL; Potential Fail Date: 25):   LISINOPRIL TAB 10 MG last filled on 25 for 55 day supply. Next refill due: 25    Prescribed si tablet/capsule daily    Per Reconcile Dispense History and Insurer Portal: last filled on 25 for 55 day supply.     Per [facility]:  1 refills remaining.    BP Readings from Last 3 Encounters:   11/15/24 100/64   08/15/24 103/62   24 (!) 123/56     CrCl cannot be calculated (Patient's most recent lab result is older than the maximum 180 days allowed.).  Lab Results   Component Value Date    CREATININE 0.79 2023     Lab Results   Component Value Date    K 4.3 2023     DIABETES ADHERENCE    Insurance Records claims through  25  (Prior Year PDC = 91% - PASSED ; YTD PDC = 85%; Potential Fail Date: 25):   JARDIANCE TAB 10 MG last filled on 25 for 30 day supply. Next refill due: 25    Prescribed si tablet/capsule daily    Per Reconcile Dispense History and Insurer Portal: last filled on 25 for 30 day supply.     Per [facility]: Billed through [employer]. last filled on 25 for 30 day supply and READY to . 3 refills remaining. They are processing the refill now per pt's request.    Lab Results   Component Value Date    LABA1C 5.2 2023    LABA1C 5.5 2022    LABA1C 5.3 2020     STATIN ADHERENCE    Insurance Records claims through  25  (Prior Year PDC = 98% - PASSED ; YTD PDC = FIRST FILL; Potential Fail Date: 25):   ATORVASTATIN TAB 40 MG

## 2025-05-08 ENCOUNTER — TRANSCRIBE ORDERS (OUTPATIENT)
Facility: HOSPITAL | Age: 75
End: 2025-05-08

## 2025-05-08 DIAGNOSIS — Z12.31 VISIT FOR SCREENING MAMMOGRAM: Primary | ICD-10-CM

## 2025-05-15 DIAGNOSIS — A09 TRAVELER'S DIARRHEA: Primary | ICD-10-CM

## 2025-05-15 RX ORDER — AZITHROMYCIN 250 MG/1
TABLET, FILM COATED ORAL
Qty: 6 TABLET | Refills: 0 | Status: SHIPPED | OUTPATIENT
Start: 2025-05-15 | End: 2025-05-25

## 2025-06-23 ENCOUNTER — HOSPITAL ENCOUNTER (OUTPATIENT)
Facility: HOSPITAL | Age: 75
Discharge: HOME | End: 2025-06-26
Attending: STUDENT IN AN ORGANIZED HEALTH CARE EDUCATION/TRAINING PROGRAM
Payer: MEDICARE

## 2025-06-23 DIAGNOSIS — Z12.31 VISIT FOR SCREENING MAMMOGRAM: ICD-10-CM

## 2025-06-23 PROCEDURE — 77063 BREAST TOMOSYNTHESIS BI: CPT

## 2025-06-30 ENCOUNTER — RESULTS FOLLOW-UP (OUTPATIENT)
Age: 75
End: 2025-06-30

## 2025-07-03 DIAGNOSIS — I10 PRIMARY HYPERTENSION: ICD-10-CM

## 2025-07-03 RX ORDER — LISINOPRIL 10 MG/1
10 TABLET ORAL DAILY
Qty: 90 TABLET | Refills: 3 | Status: SHIPPED | OUTPATIENT
Start: 2025-07-03

## 2025-07-03 NOTE — TELEPHONE ENCOUNTER
Last appointment: 11/15/24  Next appointment: 8/21/25  Previous refill encounter(s): 11/15/24 #90 with 2 refills    Requested Prescriptions     Pending Prescriptions Disp Refills    lisinopril (PRINIVIL;ZESTRIL) 10 MG tablet [Pharmacy Med Name: LISINOPRIL 10 MG TABLET] 90 tablet 3     Sig: TAKE 1 TABLET BY MOUTH EVERY DAY         For Pharmacy Admin Tracking Only    Program: Medication Refill  CPA in place:    Recommendation Provided To:   Intervention Detail: New Rx: 1, reason: Patient Preference  Intervention Accepted By:   Gap Closed?:    Time Spent (min): 5

## 2025-08-15 SDOH — HEALTH STABILITY: PHYSICAL HEALTH: ON AVERAGE, HOW MANY DAYS PER WEEK DO YOU ENGAGE IN MODERATE TO STRENUOUS EXERCISE (LIKE A BRISK WALK)?: 6 DAYS

## 2025-08-15 SDOH — HEALTH STABILITY: PHYSICAL HEALTH: ON AVERAGE, HOW MANY MINUTES DO YOU ENGAGE IN EXERCISE AT THIS LEVEL?: 50 MIN

## 2025-08-15 ASSESSMENT — LIFESTYLE VARIABLES
HOW OFTEN DURING THE LAST YEAR HAVE YOU FAILED TO DO WHAT WAS NORMALLY EXPECTED FROM YOU BECAUSE OF DRINKING: NEVER
HOW OFTEN DURING THE LAST YEAR HAVE YOU FOUND THAT YOU WERE NOT ABLE TO STOP DRINKING ONCE YOU HAD STARTED: NEVER
HOW OFTEN DO YOU HAVE SIX OR MORE DRINKS ON ONE OCCASION: 1
HOW OFTEN DURING THE LAST YEAR HAVE YOU BEEN UNABLE TO REMEMBER WHAT HAPPENED THE NIGHT BEFORE BECAUSE YOU HAD BEEN DRINKING: NEVER
HOW OFTEN DURING THE LAST YEAR HAVE YOU BEEN UNABLE TO REMEMBER WHAT HAPPENED THE NIGHT BEFORE BECAUSE YOU HAD BEEN DRINKING: NEVER
HOW OFTEN DURING THE LAST YEAR HAVE YOU HAD A FEELING OF GUILT OR REMORSE AFTER DRINKING: NEVER
HOW OFTEN DURING THE LAST YEAR HAVE YOU FOUND THAT YOU WERE NOT ABLE TO STOP DRINKING ONCE YOU HAD STARTED: NEVER
HOW OFTEN DO YOU HAVE A DRINK CONTAINING ALCOHOL: 5
HAS A RELATIVE, FRIEND, DOCTOR, OR ANOTHER HEALTH PROFESSIONAL EXPRESSED CONCERN ABOUT YOUR DRINKING OR SUGGESTED YOU CUT DOWN: NO
HAS A RELATIVE, FRIEND, DOCTOR, OR ANOTHER HEALTH PROFESSIONAL EXPRESSED CONCERN ABOUT YOUR DRINKING OR SUGGESTED YOU CUT DOWN: NO
HOW OFTEN DURING THE LAST YEAR HAVE YOU HAD A FEELING OF GUILT OR REMORSE AFTER DRINKING: NEVER
HOW OFTEN DURING THE LAST YEAR HAVE YOU NEEDED AN ALCOHOLIC DRINK FIRST THING IN THE MORNING TO GET YOURSELF GOING AFTER A NIGHT OF HEAVY DRINKING: NEVER
HAVE YOU OR SOMEONE ELSE BEEN INJURED AS A RESULT OF YOUR DRINKING: NO
HOW MANY STANDARD DRINKS CONTAINING ALCOHOL DO YOU HAVE ON A TYPICAL DAY: 1
HOW OFTEN DURING THE LAST YEAR HAVE YOU FAILED TO DO WHAT WAS NORMALLY EXPECTED FROM YOU BECAUSE OF DRINKING: NEVER
HOW OFTEN DO YOU HAVE A DRINK CONTAINING ALCOHOL: 4 OR MORE TIMES A WEEK
HOW MANY STANDARD DRINKS CONTAINING ALCOHOL DO YOU HAVE ON A TYPICAL DAY: 1 OR 2
HOW OFTEN DURING THE LAST YEAR HAVE YOU NEEDED AN ALCOHOLIC DRINK FIRST THING IN THE MORNING TO GET YOURSELF GOING AFTER A NIGHT OF HEAVY DRINKING: NEVER
HAVE YOU OR SOMEONE ELSE BEEN INJURED AS A RESULT OF YOUR DRINKING: NO

## 2025-08-15 ASSESSMENT — PATIENT HEALTH QUESTIONNAIRE - PHQ9
1. LITTLE INTEREST OR PLEASURE IN DOING THINGS: SEVERAL DAYS
SUM OF ALL RESPONSES TO PHQ QUESTIONS 1-9: 2
2. FEELING DOWN, DEPRESSED OR HOPELESS: SEVERAL DAYS

## 2025-08-19 SDOH — ECONOMIC STABILITY: FOOD INSECURITY: WITHIN THE PAST 12 MONTHS, YOU WORRIED THAT YOUR FOOD WOULD RUN OUT BEFORE YOU GOT MONEY TO BUY MORE.: NEVER TRUE

## 2025-08-19 SDOH — ECONOMIC STABILITY: TRANSPORTATION INSECURITY
IN THE PAST 12 MONTHS, HAS LACK OF TRANSPORTATION KEPT YOU FROM MEETINGS, WORK, OR FROM GETTING THINGS NEEDED FOR DAILY LIVING?: NO

## 2025-08-19 SDOH — ECONOMIC STABILITY: FOOD INSECURITY: WITHIN THE PAST 12 MONTHS, THE FOOD YOU BOUGHT JUST DIDN'T LAST AND YOU DIDN'T HAVE MONEY TO GET MORE.: NEVER TRUE

## 2025-08-19 SDOH — ECONOMIC STABILITY: INCOME INSECURITY: IN THE LAST 12 MONTHS, WAS THERE A TIME WHEN YOU WERE NOT ABLE TO PAY THE MORTGAGE OR RENT ON TIME?: NO

## 2025-08-19 SDOH — ECONOMIC STABILITY: TRANSPORTATION INSECURITY
IN THE PAST 12 MONTHS, HAS THE LACK OF TRANSPORTATION KEPT YOU FROM MEDICAL APPOINTMENTS OR FROM GETTING MEDICATIONS?: NO

## 2025-08-20 ENCOUNTER — HOSPITAL ENCOUNTER (OUTPATIENT)
Facility: HOSPITAL | Age: 75
Discharge: HOME | End: 2025-08-23
Attending: STUDENT IN AN ORGANIZED HEALTH CARE EDUCATION/TRAINING PROGRAM
Payer: MEDICARE

## 2025-08-20 VITALS — BODY MASS INDEX: 25.95 KG/M2 | HEIGHT: 64 IN | WEIGHT: 152 LBS

## 2025-08-20 DIAGNOSIS — R92.8 ABNORMAL MAMMOGRAM: ICD-10-CM

## 2025-08-20 PROCEDURE — G0279 TOMOSYNTHESIS, MAMMO: HCPCS

## 2025-08-21 ENCOUNTER — OFFICE VISIT (OUTPATIENT)
Age: 75
End: 2025-08-21
Payer: MEDICARE

## 2025-08-21 VITALS
TEMPERATURE: 97.2 F | WEIGHT: 149.91 LBS | OXYGEN SATURATION: 99 % | DIASTOLIC BLOOD PRESSURE: 78 MMHG | BODY MASS INDEX: 25.59 KG/M2 | RESPIRATION RATE: 16 BRPM | HEIGHT: 64 IN | HEART RATE: 73 BPM | SYSTOLIC BLOOD PRESSURE: 138 MMHG

## 2025-08-21 DIAGNOSIS — E55.9 VITAMIN D DEFICIENCY: ICD-10-CM

## 2025-08-21 DIAGNOSIS — I50.22 HEART FAILURE WITH MILDLY REDUCED EJECTION FRACTION (HFMREF) (HCC): ICD-10-CM

## 2025-08-21 DIAGNOSIS — E78.2 MIXED HYPERLIPIDEMIA: ICD-10-CM

## 2025-08-21 DIAGNOSIS — E03.9 ACQUIRED HYPOTHYROIDISM: ICD-10-CM

## 2025-08-21 DIAGNOSIS — Z00.00 MEDICARE ANNUAL WELLNESS VISIT, SUBSEQUENT: Primary | ICD-10-CM

## 2025-08-21 DIAGNOSIS — F41.9 ANXIETY: ICD-10-CM

## 2025-08-21 PROCEDURE — 99214 OFFICE O/P EST MOD 30 MIN: CPT | Performed by: STUDENT IN AN ORGANIZED HEALTH CARE EDUCATION/TRAINING PROGRAM

## 2025-08-21 RX ORDER — CARVEDILOL 6.25 MG/1
6.25 TABLET ORAL 2 TIMES DAILY WITH MEALS
COMMUNITY
Start: 2025-08-07 | End: 2025-08-21

## 2025-08-21 RX ORDER — DIAZEPAM 2 MG/1
1-2 TABLET ORAL DAILY PRN
Qty: 30 TABLET | Refills: 2
Start: 2025-08-21 | End: 2025-08-21 | Stop reason: SDUPTHER

## 2025-08-21 RX ORDER — DIAZEPAM 2 MG/1
1-2 TABLET ORAL DAILY PRN
Qty: 30 TABLET | Refills: 3 | Status: SHIPPED | OUTPATIENT
Start: 2025-08-21 | End: 2026-02-17

## 2025-08-22 LAB
25(OH)D3 SERPL-MCNC: 36.2 NG/ML (ref 30–100)
ANION GAP SERPL CALC-SCNC: 11 MMOL/L (ref 2–14)
BUN SERPL-MCNC: 19 MG/DL (ref 8–23)
BUN/CREAT SERPL: 26 (ref 12–20)
CALCIUM SERPL-MCNC: 9.3 MG/DL (ref 8.8–10.2)
CHLORIDE SERPL-SCNC: 103 MMOL/L (ref 98–107)
CHOLEST SERPL-MCNC: 183 MG/DL (ref 0–200)
CO2 SERPL-SCNC: 25 MMOL/L (ref 20–29)
CREAT SERPL-MCNC: 0.74 MG/DL (ref 0.6–1)
GLUCOSE SERPL-MCNC: 93 MG/DL (ref 65–100)
HDLC SERPL-MCNC: 94 MG/DL (ref 40–60)
HDLC SERPL: 1.9 (ref 0–5)
LDLC SERPL CALC-MCNC: 76 MG/DL (ref 0–100)
POTASSIUM SERPL-SCNC: 4.6 MMOL/L (ref 3.5–5.1)
SODIUM SERPL-SCNC: 139 MMOL/L (ref 136–145)
T4 FREE SERPL-MCNC: 1.4 NG/DL (ref 0.9–1.6)
TRIGL SERPL-MCNC: 65 MG/DL (ref 0–150)
TSH, 3RD GENERATION: 0.93 UIU/ML (ref 0.27–4.2)
VLDLC SERPL CALC-MCNC: 13 MG/DL